# Patient Record
Sex: MALE | Race: WHITE | NOT HISPANIC OR LATINO | Employment: FULL TIME | URBAN - METROPOLITAN AREA
[De-identification: names, ages, dates, MRNs, and addresses within clinical notes are randomized per-mention and may not be internally consistent; named-entity substitution may affect disease eponyms.]

---

## 2018-08-24 ENCOUNTER — CONVERSION ENCOUNTER (OUTPATIENT)
Dept: SURGERY | Facility: CLINIC | Age: 58
End: 2018-08-24

## 2018-08-24 ENCOUNTER — OFFICE VISIT CONVERTED (OUTPATIENT)
Dept: SURGERY | Facility: CLINIC | Age: 58
End: 2018-08-24
Attending: SURGERY

## 2018-10-05 ENCOUNTER — OFFICE VISIT CONVERTED (OUTPATIENT)
Dept: UROLOGY | Facility: CLINIC | Age: 58
End: 2018-10-05
Attending: UROLOGY

## 2020-11-09 ENCOUNTER — OFFICE VISIT CONVERTED (OUTPATIENT)
Dept: SURGERY | Facility: CLINIC | Age: 60
End: 2020-11-09
Attending: SURGERY

## 2021-03-03 ENCOUNTER — HOSPITAL ENCOUNTER (OUTPATIENT)
Dept: GASTROENTEROLOGY | Facility: HOSPITAL | Age: 61
Setting detail: HOSPITAL OUTPATIENT SURGERY
Discharge: HOME | End: 2021-03-03
Attending: SURGERY

## 2021-05-10 NOTE — H&P
History and Physical      Patient Name: Ferdinand Kaye   Patient ID: [id number]   Sex: Male   YOB: 1960    Primary Care Provider: Wilfred ZAFAR   Referring Provider: Wilfred ZAFAR    Visit Date: November 9, 2020    Provider: Boris Espitia MD   Location: [facility]   Location Address: [address] [place] [address]  [id number]   Location Phone: [phone]          Chief Complaint  · Outpatient History & Physical / Surgical Orders  · Colon Consult      History Of Present Illness  Ferdinand Kaye is a 60 year old /White male who presents to the office today as a consult from [facility] Other.      Patient is here because he was on a callback list to have a colonoscopy.  I performed a colonoscopy on 9/5/2018 and removed an approximately 2 cm sized tubular adenoma from the ascending colon and the polyp was removed piecemeal.  No family history of colon cancer no change in normal bowel function.  The colonoscopy in 2018 was the patient's first colonoscopy.       Past Medical History  Disease Name Date Onset Notes   Allergic rhinitis, chronic --  --    Groin pain --  --    High blood pressure --  --    Shortness Of Air --  --          Past Surgical History  Procedure Name Date Notes   Colonoscopy --  --    Cyst Removal --  --    Inguinal Hernia Repair --  --          Allergy List  Allergen Name Date Reaction Notes   PENICILLINS --  --  --        Allergies Reconciled  Family Medical History  Disease Name Relative/Age Notes   Diabetes, unspecified type Grandmother (paternal)/   Grandmother (paternal)   Family history of colon cancer Grandmother (paternal)/60s   Grandmother (paternal)/60s   Family history of breast cancer  Aunt/50s         Social History  Finding Status Start/Stop Quantity Notes   Alcohol Current some day --/-- --  drinks rarely; liquor   Caffeine Current every day --/-- --  drinks regularly; coffee, tea and soft drinks; 5 or more times per  "day   Second hand smoke exposure Current some day --/-- --  yes   Tobacco Current every day 18/-- --  current everyday smoker; started smoking at age 18; smoked 20 cigarette(s) per day         Review of Systems  · Constitutional  o Denies  o : fever, chills  · Cardiovascular  o Denies  o : chest pain on exertion  · Respiratory  o Denies  o : shortness of breath, cough  · Gastrointestinal  o Denies  o : nausea, vomiting      Vitals  Date Time BP Position Site L\R Cuff Size HR RR TEMP (F) WT  HT  BMI kg/m2 BSA m2 O2 Sat FR L/min FiO2 HC       11/09/2020 08:36 AM       14  156lbs 2oz 5'  7\" 24.45 1.83             Physical Examination  · Constitutional  o Appearance  o : healthy appearing, alert and in no acute distress, reliable historian, here alone  · Head and Face  o Head  o :   § Inspection  § : no visable deformities or lesions  · Eyes  o Conjunctivae  o : clear, wearing glasses  o Sclerae  o : clear  · Neck  o Inspection/Palpation  o : normal appearance, no masses, trachea midline  · Respiratory  o Respiratory Effort  o : breathing unlabored, respiratory effort appears normal  o Inspection of Chest  o : normal appearance, no retractions  · Cardiovascular  o Heart  o : regular rate and rhythm  · Gastrointestinal  o Abdominal Examination  o :   § Abdomen  § : soft, nondistended  · Skin and Subcutaneous Tissue  o General Inspection  o : no visible concerning rashes or lesions present  · Neurologic  o Cranial Nerves  o : no obvious motor deficits  o Sensation  o : no obvious sensory deficits  o Gait and Station  o :   § Gait Screening  § : normal gait, able to stand without diffculty  o Cerebellar Function  o : no obvious abnormalities  · Psychiatric  o Judgement and Insight  o : judgment and insight intact  o Mood and Affect  o : mood normal, affect appropriate          Assessment  · Pre-Surgical Orders     V72.84  · Colonic Polyps, Personal History of     V12.72  · Preop " testing     V72.84/Z01.818      Plan  · Orders  o Colonoscopy (75823) - V72.84 - 01/13/2021  o [facility] Pre-Op Covid-19 Screening (95656) - V72.84/Z01.818 - 01/08/2021   at 12:15  · Medications  o Medications have been Reconciled  o Transition of Care or Provider Policy  · Instructions  o PLAN: Colonoscopy  o Outpatient  o The indications, options, risks, benefits, and expected outcomes of the planned procedure were discussed with the patient and the patient agrees to proceed.   o IV: LR@ 30 ml/hr  o Anesthesia: MAC  o PLEASE SIGN PERMIT FOR: Colonscopy with possible biopsy and possible polypectomy  o Electronically Identified Patient Education Materials Provided Electronically            Electronically Signed by: Boris Espitia MD -Author on November 9, 2020 09:03:49 AM

## 2021-05-14 VITALS — HEIGHT: 67 IN | RESPIRATION RATE: 14 BRPM | BODY MASS INDEX: 24.5 KG/M2 | WEIGHT: 156.12 LBS

## 2021-05-16 VITALS — WEIGHT: 149.5 LBS | RESPIRATION RATE: 14 BRPM | BODY MASS INDEX: 22.66 KG/M2 | HEIGHT: 68 IN

## 2021-05-16 VITALS — BODY MASS INDEX: 22.73 KG/M2 | RESPIRATION RATE: 14 BRPM | HEIGHT: 68 IN | WEIGHT: 150 LBS

## 2022-10-03 ENCOUNTER — OFFICE VISIT (OUTPATIENT)
Dept: SURGERY | Facility: CLINIC | Age: 62
End: 2022-10-03

## 2022-10-03 ENCOUNTER — PREP FOR SURGERY (OUTPATIENT)
Dept: OTHER | Facility: HOSPITAL | Age: 62
End: 2022-10-03

## 2022-10-03 VITALS — HEART RATE: 90 BPM | WEIGHT: 164 LBS | BODY MASS INDEX: 25.74 KG/M2 | HEIGHT: 67 IN

## 2022-10-03 DIAGNOSIS — Z80.0 FAMILY HISTORY OF COLON CANCER: ICD-10-CM

## 2022-10-03 DIAGNOSIS — Z86.010 HISTORY OF COLONIC POLYPS: ICD-10-CM

## 2022-10-03 DIAGNOSIS — Z12.11 SCREENING FOR MALIGNANT NEOPLASM OF COLON: Primary | ICD-10-CM

## 2022-10-03 PROBLEM — Z86.0100 HISTORY OF COLONIC POLYPS: Status: ACTIVE | Noted: 2022-10-03

## 2022-10-03 PROCEDURE — S0285 CNSLT BEFORE SCREEN COLONOSC: HCPCS | Performed by: NURSE PRACTITIONER

## 2022-10-03 RX ORDER — MULTIPLE VITAMINS W/ MINERALS TAB 9MG-400MCG
TAB ORAL
COMMUNITY
End: 2022-12-05

## 2022-10-03 RX ORDER — CHOLECALCIFEROL (VITAMIN D3) 1250 MCG
CAPSULE ORAL
COMMUNITY

## 2022-10-03 RX ORDER — MULTIPLE VITAMINS W/ MINERALS TAB 9MG-400MCG
TAB ORAL
COMMUNITY

## 2022-10-03 RX ORDER — POLYETHYLENE GLYCOL 3350 17 G/17G
POWDER, FOR SOLUTION ORAL
Qty: 238 PACKET | Refills: 0 | Status: SHIPPED | OUTPATIENT
Start: 2022-10-03 | End: 2022-12-05

## 2022-10-03 NOTE — PROGRESS NOTES
Chief Complaint: Colonoscopy (consult)    Subjective      Colonoscopy consultation       History of Present Illness  Ferdinand Kaye is a 62 y.o. male presents to [facility] for colonoscopy consultation.    Patient presents today without complaints for screening colonoscopy.  He denies any abdominal pain, change in bowel habit, or rectal bleeding.    Admits to family history of colon cancer with his paternal grandmother.    Admits to history of colonic polyps.    3/21: Colonoscopy (espitia): Ascending - tubular adenoma; diverticulosis.     9/18: Colonoscopy (Espitia): Ascending- tubular adenoma; Sigmoid - hyperplastic; @10cm - hyperplastic; diverticulosis; internal hemorrhoids.     Objective     History reviewed. No pertinent past medical history.    History reviewed. No pertinent surgical history.    Outpatient Medications Marked as Taking for the 10/3/22 encounter (Office Visit) with Jocelin Sanchez APRN   Medication Sig Dispense Refill   • ascorbic acid (VITAMIN C) 1000 MG tablet Vitamin C 1,000 mg oral tablet take 1 tablet by oral route daily   Suspended     • Cholecalciferol (Vitamin D3) 1.25 MG (67504 UT) capsule Take  by mouth Every 7 (Seven) Days.     • multivitamin with minerals tablet tablet      • multivitamin with minerals tablet tablet One Daily Multivitamin oral tablet take 1 tablet by oral route daily  qd   Suspended         Allergies   Allergen Reactions   • Penicillins Hives        Family History   Problem Relation Age of Onset   • Alcohol abuse Neg Hx        Social History     Socioeconomic History   • Marital status:    Tobacco Use   • Smoking status: Current Every Day Smoker     Packs/day: 1.00   • Smokeless tobacco: Never Used   Vaping Use   • Vaping Use: Never used   Substance and Sexual Activity   • Alcohol use: Never   • Drug use: Never   • Sexual activity: Defer       Review of Systems   Constitutional: Negative for chills and fever.   Gastrointestinal:  "Negative for abdominal distention, abdominal pain, anal bleeding, blood in stool, constipation, diarrhea and rectal pain.        Vital Signs:   Pulse 90   Ht 170.2 cm (67\")   Wt 74.4 kg (164 lb)   BMI 25.69 kg/m²      Physical Exam  Vitals and nursing note reviewed.   Constitutional:       General: He is not in acute distress.     Appearance: Normal appearance.   HENT:      Head: Normocephalic.   Cardiovascular:      Rate and Rhythm: Normal rate.   Pulmonary:      Effort: Pulmonary effort is normal.      Breath sounds: No stridor. No wheezing.   Abdominal:      Palpations: Abdomen is soft.      Tenderness: There is no guarding.   Musculoskeletal:         General: No deformity. Normal range of motion.   Skin:     General: Skin is warm and dry.      Coloration: Skin is not jaundiced.   Neurological:      General: No focal deficit present.      Mental Status: He is alert and oriented to person, place, and time.   Psychiatric:         Mood and Affect: Mood normal.         Thought Content: Thought content normal.          Result Review :          []  Laboratory  []  Radiology  [x]  Pathology  []  Microbiology  []  EKG/Telemetry   []  Cardiology/Vascular   [x]  Old records  Today I reviewed Dr. Winslow's previous colonoscopies and pathology's     Assessment and Plan    Diagnoses and all orders for this visit:    1. Screening for malignant neoplasm of colon (Primary)    2. History of colonic polyps    3. Family history of colon cancer    Other orders  -     polyethylene glycol (MIRALAX) 17 g packet; Take as directed.  Instructions given in office.  Dispense: 238 g bottle  Dispense: 238 packet; Refill: 0        Follow Up   Return for Schedule colonoscopy with Dr. Espitia on 12/7/2022 at [facility] [place].     Hospital arrival time: 0900.    Possible risks/complications, benefits, and alternatives to surgical or invasive procedure have been explained to patient and/or legal guardian.     Patient has been evaluated " and can tolerate anesthesia and/or sedation. Risks, benefits, and alternatives to anesthesia and sedation have been explained to patient and/or legal guardian.  Patient verbalizes understanding and is will proceed with above plan.     Patient was given instructions and counseling regarding his condition or for health maintenance advice. Please see specific information pulled into the AVS if appropriate.     The office note was dictated with the help of the dragon dictation system.

## 2022-12-07 ENCOUNTER — ANESTHESIA EVENT (OUTPATIENT)
Dept: GASTROENTEROLOGY | Facility: HOSPITAL | Age: 62
End: 2022-12-07

## 2022-12-07 ENCOUNTER — ANESTHESIA (OUTPATIENT)
Dept: GASTROENTEROLOGY | Facility: HOSPITAL | Age: 62
End: 2022-12-07

## 2022-12-07 ENCOUNTER — HOSPITAL ENCOUNTER (OUTPATIENT)
Facility: HOSPITAL | Age: 62
Setting detail: HOSPITAL OUTPATIENT SURGERY
Discharge: HOME | End: 2022-12-07
Attending: SURGERY | Admitting: SURGERY

## 2022-12-07 VITALS
HEART RATE: 80 BPM | HEIGHT: 67 IN | RESPIRATION RATE: 15 BRPM | BODY MASS INDEX: 24.19 KG/M2 | OXYGEN SATURATION: 94 % | DIASTOLIC BLOOD PRESSURE: 67 MMHG | TEMPERATURE: 98.3 F | WEIGHT: 154.1 LBS | SYSTOLIC BLOOD PRESSURE: 95 MMHG

## 2022-12-07 PROBLEM — Z86.0100 HISTORY OF COLONIC POLYPS: Status: RESOLVED | Noted: 2022-10-03 | Resolved: 2022-12-07

## 2022-12-07 PROBLEM — Z12.11 SCREENING FOR MALIGNANT NEOPLASM OF COLON: Status: RESOLVED | Noted: 2022-10-03 | Resolved: 2022-12-07

## 2022-12-07 PROBLEM — Z80.0 FAMILY HISTORY OF COLON CANCER: Status: RESOLVED | Noted: 2022-10-03 | Resolved: 2022-12-07

## 2022-12-07 PROBLEM — Z86.010 HISTORY OF COLONIC POLYPS: Status: RESOLVED | Noted: 2022-10-03 | Resolved: 2022-12-07

## 2022-12-07 PROCEDURE — 25010000002 PROPOFOL 10 MG/ML EMULSION: Performed by: NURSE ANESTHETIST, CERTIFIED REGISTERED

## 2022-12-07 RX ORDER — LIDOCAINE HYDROCHLORIDE 20 MG/ML
INJECTION, SOLUTION EPIDURAL; INFILTRATION; INTRACAUDAL; PERINEURAL AS NEEDED
Status: DISCONTINUED | OUTPATIENT
Start: 2022-12-07 | End: 2022-12-07 | Stop reason: SURG

## 2022-12-07 RX ORDER — MULTIPLE VITAMINS W/ MINERALS TAB 9MG-400MCG
1 TAB ORAL DAILY
COMMUNITY

## 2022-12-07 RX ORDER — SODIUM CHLORIDE, SODIUM LACTATE, POTASSIUM CHLORIDE, CALCIUM CHLORIDE 600; 310; 30; 20 MG/100ML; MG/100ML; MG/100ML; MG/100ML
30 INJECTION, SOLUTION INTRAVENOUS CONTINUOUS
Status: DISCONTINUED | OUTPATIENT
Start: 2022-12-07 | End: 2022-12-07 | Stop reason: HOSPADM

## 2022-12-07 RX ORDER — PROPOFOL 10 MG/ML
VIAL (ML) INTRAVENOUS AS NEEDED
Status: DISCONTINUED | OUTPATIENT
Start: 2022-12-07 | End: 2022-12-07 | Stop reason: SURG

## 2022-12-07 RX ADMIN — PROPOFOL 200 MCG/KG/MIN: 10 INJECTION, EMULSION INTRAVENOUS at 10:29

## 2022-12-07 RX ADMIN — LIDOCAINE HYDROCHLORIDE 25 MG: 20 INJECTION, SOLUTION EPIDURAL; INFILTRATION; INTRACAUDAL; PERINEURAL at 10:29

## 2022-12-07 RX ADMIN — PROPOFOL 80 MG: 10 INJECTION, EMULSION INTRAVENOUS at 10:29

## 2022-12-07 RX ADMIN — LIDOCAINE HYDROCHLORIDE 100 MG: 20 INJECTION, SOLUTION EPIDURAL; INFILTRATION; INTRACAUDAL; PERINEURAL at 10:33

## 2022-12-07 RX ADMIN — SODIUM CHLORIDE, POTASSIUM CHLORIDE, SODIUM LACTATE AND CALCIUM CHLORIDE: 600; 310; 30; 20 INJECTION, SOLUTION INTRAVENOUS at 10:27

## 2022-12-07 NOTE — ANESTHESIA POSTPROCEDURE EVALUATION
Patient: Ferdinand Kaye    Procedure Summary     Date: 12/07/22 Room / Location: [facility] ENDOSCOPY 1 / [facility] ENDOSCOPY    Anesthesia Start: 1027 Anesthesia Stop: 1056    Procedure: COLONOSCOPY Diagnosis:       Screening for malignant neoplasm of colon      History of colonic polyps      Family history of colon cancer      (Screening for malignant neoplasm of colon [Z12.11])      (History of colonic polyps [Z86.010])      (Family history of colon cancer [Z80.0])    Surgeons: Boris Espitia MD Provider: Jack Cain MD    Anesthesia Type: general ASA Status: 1          Anesthesia Type: general    Vitals  Vitals Value Taken Time   BP 95/67 12/07/22 1117   Temp 36.8 °C (98.3 °F) 12/07/22 1115   Pulse 78 12/07/22 1119   Resp 15 12/07/22 1115   SpO2 95 % 12/07/22 1119   Vitals shown include unvalidated device data.        Post Anesthesia Care and Evaluation    Patient location during evaluation: bedside  Patient participation: complete - patient participated  Level of consciousness: awake  Pain management: adequate    Airway patency: patent  Anesthetic complications: No anesthetic complications  PONV Status: none  Cardiovascular status: acceptable and stable  Respiratory status: acceptable  Hydration status: acceptable    Comments: An Anesthesiologist personally participated in the most demanding procedures (including induction and emergence if applicable) in the anesthesia plan, monitored the course of anesthesia administration at frequent intervals and remained physically present and available for immediate diagnosis and treatment of emergencies.

## 2022-12-07 NOTE — H&P
"Chief Complaint: Colonoscopy (consult)    Subjective      Colonoscopy consultation       History of Present Illness  Ferdinand Kaye is a 62 y.o. male presents to [facility] SUR[facility]ERY for colonoscopy consultation.    Patient presents today without complaints for screening colonoscopy.  He denies any abdominal pain, change in bowel habit, or rectal bleeding.    Admits to family history of colon cancer with his paternal grandmother.    Admits to history of colonic polyps.    3/21: Colonoscopy (espitia): Ascending - tubular adenoma; diverticulosis.     9/18: Colonoscopy (Espitia): Ascending- tubular adenoma; Sigmoid - hyperplastic; @10cm - hyperplastic; diverticulosis; internal hemorrhoids.     Objective     History reviewed. No pertinent past medical history.    History reviewed. No pertinent surgical history.    Outpatient Medications Marked as Taking for the 10/3/22 encounter (Office Visit) with Jocelin Sanchez APRN   Medication Sig Dispense Refill   • ascorbic acid (VITAMIN C) 1000 MG tablet Vitamin C 1,000 mg oral tablet take 1 tablet by oral route daily   Suspended     • Cholecalciferol (Vitamin D3) 1.25 MG (23484 UT) capsule Take  by mouth Every 7 (Seven) Days.     • multivitamin with minerals tablet tablet      • multivitamin with minerals tablet tablet One Daily Multivitamin oral tablet take 1 tablet by oral route daily  qd   Suspended         Allergies   Allergen Reactions   • Penicillins Hives        Family History   Problem Relation Age of Onset   • Alcohol abuse Neg Hx        Social History     Socioeconomic History   • Marital status:    Tobacco Use   • Smoking status: Current Every Day Smoker     Packs/day: 1.00   • Smokeless tobacco: Never Used   Vaping Use   • Vaping Use: Never used   Substance and Sexual Activity   • Alcohol use: Never   • Drug use: Never   • Sexual activity: Defer       Vital Signs:   Pulse 90   Ht 170.2 cm (67\")   Wt 74.4 kg (164 lb)   BMI 25.69 kg/m²    "   Physical Exam  Vitals and nursing note reviewed.   Constitutional:       General: He is not in acute distress.     Appearance: Normal appearance.   Cardiovascular:      Rate and Rhythm: Normal rate.   Pulmonary:      Effort: Pulmonary effort is normal.   Skin:     General: Skin is warm and dry.   Neurological:      General: No focal deficit present.     Assessment   Personal history of colon polyps    Plan  Colonoscopy    Risks and benefits discussed    Boris Espitia M.D.  12/07/22    Electronically signed by Boris Espitia MD, 12/07/22, 7:19 AM EST.

## 2023-07-12 PROBLEM — N20.0 RENAL CALCULI: Status: ACTIVE | Noted: 2023-07-12

## 2023-07-13 PROBLEM — N20.1 URETERAL STONE: Status: ACTIVE | Noted: 2023-07-12

## 2023-07-13 PROBLEM — N13.39 OTHER HYDRONEPHROSIS: Status: ACTIVE | Noted: 2023-07-13

## 2023-07-13 PROBLEM — N20.0 RENAL CALCULI: Status: RESOLVED | Noted: 2023-07-12 | Resolved: 2023-07-13

## 2023-07-27 NOTE — PRE-PROCEDURE INSTRUCTIONS
IMPORTANT INSTRUCTIONS - PRE-ADMISSION TESTING  DO NOT EAT OR CHEW anything after midnight the night before your procedure.    You may have CLEAR liquids up to _2 hours prior to ARRIVAL time.   Take the following medications the morning of your procedure with JUST A SIP OF WATER: INHALER, PYRIDIUM  IF NEEDED, TAMSULOSIN, PROZAC, ZOFRAN IF NEEDED    DO NOT BRING your medications to the hospital with you, UNLESS something has changed since your PRE-Admission Testing appointment.  Hold all vitamins, supplements, and NSAIDS (Non- steroidal anti-inflammatory meds) for one week prior to surgery (you MAY take Tylenol or Acetaminophen).  If you are diabetic, check your blood sugar the morning of your procedure. If it is less than 70 or if you are feeling symptomatic, call the following number for further instructions: [phone]  Use your inhalers/nebulizers as usual, the morning of your procedure. BRING YOUR INHALERS with you.   Bring your CPAP or BIPAP to hospital, ONLY IF YOU WILL BE SPENDING THE NIGHT.   Make sure you have a ride home and have someone who will stay with you the day of your procedure after you go home.  If you have any questions, please call your Pre-Admission Testing NurseLISA at [phone].   Per anesthesia request, do not smoke for 24 hours before your procedure or as instructed by your surgeon.  Clear Liquid Diet        Find out when you need to start a clear liquid diet.   Think of “clear liquids” as anything you could read a newspaper through. This includes things like water, broth, sports drinks, or tea WITHOUT any kind of milk or cream.           Once you are told to start a clear liquid diet, only drink these things until 2 hours before arrival to the hospital or when the hospital says to stop. Total volume limitation: 8 oz.       Clear liquids you CAN drink:   Water   Clear broth: beef, chicken, vegetable, or bone broth with nothing in it   Gatorade   Lemonade or Jacob-aid   Soda    Tea, coffee (NO cream or honey)   Jell-O (without fruit)   Popsicles (without fruit or cream)   Italian ices   Juice without pulp: apple, white, grape   You may use salt, pepper, and sugar    Do NOT drink:   Milk or cream   Soy milk, almond milk, coconut milk, or other non-dairy drinks and   creamers   Milkshakes or smoothies   Tomato juice   Orange juice   Grapefruit juice   Cream soups or any other than broth         Clear Liquid Diet:  Do NOT eat any solid food.  Do NOT eat or suck on mints or candy.  Do NOT chew gum.  Do NOT drink thick liquids like milk or juice with pulp in it.  Do NOT add milk, cream, or anything like soy milk or almond milk to coffee or tea.

## 2023-07-28 ENCOUNTER — ANESTHESIA EVENT (OUTPATIENT)
Dept: PERIOP | Facility: HOSPITAL | Age: 63
End: 2023-07-28
Payer: COMMERCIAL

## 2023-07-28 ENCOUNTER — ANESTHESIA (OUTPATIENT)
Dept: PERIOP | Facility: HOSPITAL | Age: 63
End: 2023-07-28
Payer: COMMERCIAL

## 2023-07-28 ENCOUNTER — APPOINTMENT (OUTPATIENT)
Dept: GENERAL RADIOLOGY | Facility: HOSPITAL | Age: 63
End: 2023-07-28
Payer: COMMERCIAL

## 2023-07-28 ENCOUNTER — HOSPITAL ENCOUNTER (OUTPATIENT)
Facility: HOSPITAL | Age: 63
Setting detail: HOSPITAL OUTPATIENT SURGERY
Discharge: HOME | End: 2023-07-28
Attending: UROLOGY | Admitting: UROLOGY
Payer: COMMERCIAL

## 2023-07-28 VITALS
BODY MASS INDEX: 22.42 KG/M2 | DIASTOLIC BLOOD PRESSURE: 66 MMHG | SYSTOLIC BLOOD PRESSURE: 107 MMHG | HEIGHT: 68 IN | HEART RATE: 71 BPM | RESPIRATION RATE: 18 BRPM | WEIGHT: 147.93 LBS | OXYGEN SATURATION: 95 % | TEMPERATURE: 97.1 F

## 2023-07-28 DIAGNOSIS — N20.1 URETERAL STONE: ICD-10-CM

## 2023-07-28 PROCEDURE — C1758 CATHETER, URETERAL: HCPCS | Performed by: UROLOGY

## 2023-07-28 PROCEDURE — 25010000002 PROPOFOL 10 MG/ML EMULSION: Performed by: NURSE ANESTHETIST, CERTIFIED REGISTERED

## 2023-07-28 PROCEDURE — 25010000002 DEXAMETHASONE PER 1 MG: Performed by: NURSE ANESTHETIST, CERTIFIED REGISTERED

## 2023-07-28 PROCEDURE — 88300 SURGICAL PATH GROSS: CPT | Performed by: UROLOGY

## 2023-07-28 PROCEDURE — C1769 GUIDE WIRE: HCPCS | Performed by: UROLOGY

## 2023-07-28 PROCEDURE — 76000 FLUOROSCOPY <1 HR PHYS/QHP: CPT

## 2023-07-28 PROCEDURE — 25010000002 MIDAZOLAM PER 1MG: Performed by: ANESTHESIOLOGY

## 2023-07-28 PROCEDURE — 25510000001 IOPAMIDOL PER 1 ML: Performed by: UROLOGY

## 2023-07-28 PROCEDURE — 25010000002 LEVOFLOXACIN PER 250 MG: Performed by: UROLOGY

## 2023-07-28 PROCEDURE — 25010000002 SUGAMMADEX 200 MG/2ML SOLUTION: Performed by: NURSE ANESTHETIST, CERTIFIED REGISTERED

## 2023-07-28 PROCEDURE — 52332 CYSTOSCOPY AND TREATMENT: CPT | Performed by: UROLOGY

## 2023-07-28 PROCEDURE — S0260 H&P FOR SURGERY: HCPCS | Performed by: UROLOGY

## 2023-07-28 PROCEDURE — 82365 CALCULUS SPECTROSCOPY: CPT | Performed by: UROLOGY

## 2023-07-28 PROCEDURE — 25010000002 FENTANYL CITRATE (PF) 50 MCG/ML SOLUTION: Performed by: NURSE ANESTHETIST, CERTIFIED REGISTERED

## 2023-07-28 PROCEDURE — 52352 CYSTOURETERO W/STONE REMOVE: CPT | Performed by: UROLOGY

## 2023-07-28 PROCEDURE — 25010000002 ONDANSETRON PER 1 MG: Performed by: NURSE ANESTHETIST, CERTIFIED REGISTERED

## 2023-07-28 PROCEDURE — C2617 STENT, NON-COR, TEM W/O DEL: HCPCS | Performed by: UROLOGY

## 2023-07-28 DEVICE — URETERAL STENT
Type: IMPLANTABLE DEVICE | Site: URETER | Status: FUNCTIONAL
Brand: ASCERTA™

## 2023-07-28 RX ORDER — TAMSULOSIN HYDROCHLORIDE 0.4 MG/1
0.4 CAPSULE ORAL DAILY
Qty: 10 CAPSULE | Refills: 0 | Status: SHIPPED | OUTPATIENT
Start: 2023-07-28

## 2023-07-28 RX ORDER — LIDOCAINE HYDROCHLORIDE 20 MG/ML
INJECTION, SOLUTION EPIDURAL; INFILTRATION; INTRACAUDAL; PERINEURAL AS NEEDED
Status: DISCONTINUED | OUTPATIENT
Start: 2023-07-28 | End: 2023-07-28 | Stop reason: SURG

## 2023-07-28 RX ORDER — IBUPROFEN 600 MG/1
600 TABLET ORAL EVERY 6 HOURS PRN
Status: DISCONTINUED | OUTPATIENT
Start: 2023-07-28 | End: 2023-07-28 | Stop reason: HOSPADM

## 2023-07-28 RX ORDER — PROMETHAZINE HYDROCHLORIDE 25 MG/1
25 SUPPOSITORY RECTAL ONCE AS NEEDED
Status: DISCONTINUED | OUTPATIENT
Start: 2023-07-28 | End: 2023-07-28 | Stop reason: HOSPADM

## 2023-07-28 RX ORDER — OXYCODONE HYDROCHLORIDE AND ACETAMINOPHEN 5; 325 MG/1; MG/1
1 TABLET ORAL EVERY 4 HOURS PRN
Status: DISCONTINUED | OUTPATIENT
Start: 2023-07-28 | End: 2023-07-28 | Stop reason: HOSPADM

## 2023-07-28 RX ORDER — PROMETHAZINE HYDROCHLORIDE 12.5 MG/1
25 TABLET ORAL ONCE AS NEEDED
Status: DISCONTINUED | OUTPATIENT
Start: 2023-07-28 | End: 2023-07-28 | Stop reason: HOSPADM

## 2023-07-28 RX ORDER — ONDANSETRON 2 MG/ML
4 INJECTION INTRAMUSCULAR; INTRAVENOUS ONCE AS NEEDED
Status: DISCONTINUED | OUTPATIENT
Start: 2023-07-28 | End: 2023-07-28 | Stop reason: HOSPADM

## 2023-07-28 RX ORDER — ONDANSETRON 2 MG/ML
INJECTION INTRAMUSCULAR; INTRAVENOUS AS NEEDED
Status: DISCONTINUED | OUTPATIENT
Start: 2023-07-28 | End: 2023-07-28 | Stop reason: SURG

## 2023-07-28 RX ORDER — ONDANSETRON 4 MG/1
4 TABLET, FILM COATED ORAL ONCE AS NEEDED
Status: DISCONTINUED | OUTPATIENT
Start: 2023-07-28 | End: 2023-07-28 | Stop reason: HOSPADM

## 2023-07-28 RX ORDER — SODIUM CHLORIDE, SODIUM LACTATE, POTASSIUM CHLORIDE, CALCIUM CHLORIDE 600; 310; 30; 20 MG/100ML; MG/100ML; MG/100ML; MG/100ML
9 INJECTION, SOLUTION INTRAVENOUS CONTINUOUS PRN
Status: DISCONTINUED | OUTPATIENT
Start: 2023-07-28 | End: 2023-07-28 | Stop reason: HOSPADM

## 2023-07-28 RX ORDER — FENTANYL CITRATE 50 UG/ML
INJECTION, SOLUTION INTRAMUSCULAR; INTRAVENOUS AS NEEDED
Status: DISCONTINUED | OUTPATIENT
Start: 2023-07-28 | End: 2023-07-28 | Stop reason: SURG

## 2023-07-28 RX ORDER — DEXAMETHASONE SODIUM PHOSPHATE 4 MG/ML
INJECTION, SOLUTION INTRA-ARTICULAR; INTRALESIONAL; INTRAMUSCULAR; INTRAVENOUS; SOFT TISSUE AS NEEDED
Status: DISCONTINUED | OUTPATIENT
Start: 2023-07-28 | End: 2023-07-28 | Stop reason: SURG

## 2023-07-28 RX ORDER — KETOROLAC TROMETHAMINE 10 MG/1
10 TABLET, FILM COATED ORAL EVERY 6 HOURS PRN
Qty: 8 TABLET | Refills: 0 | Status: SHIPPED | OUTPATIENT
Start: 2023-07-28

## 2023-07-28 RX ORDER — MIDAZOLAM HYDROCHLORIDE 2 MG/2ML
2 INJECTION, SOLUTION INTRAMUSCULAR; INTRAVENOUS ONCE
Status: COMPLETED | OUTPATIENT
Start: 2023-07-28 | End: 2023-07-28

## 2023-07-28 RX ORDER — ROCURONIUM BROMIDE 10 MG/ML
INJECTION, SOLUTION INTRAVENOUS AS NEEDED
Status: DISCONTINUED | OUTPATIENT
Start: 2023-07-28 | End: 2023-07-28 | Stop reason: SURG

## 2023-07-28 RX ORDER — MAGNESIUM HYDROXIDE 1200 MG/15ML
LIQUID ORAL AS NEEDED
Status: DISCONTINUED | OUTPATIENT
Start: 2023-07-28 | End: 2023-07-28 | Stop reason: HOSPADM

## 2023-07-28 RX ORDER — ACETAMINOPHEN 325 MG/1
650 TABLET ORAL ONCE
Status: DISCONTINUED | OUTPATIENT
Start: 2023-07-28 | End: 2023-07-28 | Stop reason: HOSPADM

## 2023-07-28 RX ORDER — PHENAZOPYRIDINE HYDROCHLORIDE 200 MG/1
200 TABLET, FILM COATED ORAL 3 TIMES DAILY PRN
Qty: 20 TABLET | Refills: 0 | Status: SHIPPED | OUTPATIENT
Start: 2023-07-28

## 2023-07-28 RX ORDER — ACETAMINOPHEN 500 MG
1000 TABLET ORAL ONCE
Status: COMPLETED | OUTPATIENT
Start: 2023-07-28 | End: 2023-07-28

## 2023-07-28 RX ORDER — OXYCODONE HYDROCHLORIDE AND ACETAMINOPHEN 5; 325 MG/1; MG/1
1-2 TABLET ORAL EVERY 6 HOURS PRN
Qty: 12 TABLET | Refills: 0 | Status: SHIPPED | OUTPATIENT
Start: 2023-07-28

## 2023-07-28 RX ORDER — PROPOFOL 10 MG/ML
VIAL (ML) INTRAVENOUS AS NEEDED
Status: DISCONTINUED | OUTPATIENT
Start: 2023-07-28 | End: 2023-07-28 | Stop reason: SURG

## 2023-07-28 RX ORDER — PROMETHAZINE HYDROCHLORIDE 12.5 MG/1
12.5 TABLET ORAL ONCE AS NEEDED
Status: DISCONTINUED | OUTPATIENT
Start: 2023-07-28 | End: 2023-07-28 | Stop reason: HOSPADM

## 2023-07-28 RX ORDER — LEVOFLOXACIN 5 MG/ML
500 INJECTION, SOLUTION INTRAVENOUS ONCE
Status: COMPLETED | OUTPATIENT
Start: 2023-07-28 | End: 2023-07-28

## 2023-07-28 RX ADMIN — DEXAMETHASONE SODIUM PHOSPHATE 4 MG: 4 INJECTION, SOLUTION INTRAMUSCULAR; INTRAVENOUS at 16:40

## 2023-07-28 RX ADMIN — LEVOFLOXACIN 500 MG: 500 INJECTION, SOLUTION INTRAVENOUS at 16:39

## 2023-07-28 RX ADMIN — ONDANSETRON 4 MG: 2 INJECTION INTRAMUSCULAR; INTRAVENOUS at 17:11

## 2023-07-28 RX ADMIN — FENTANYL CITRATE 50 MCG: 50 INJECTION, SOLUTION INTRAMUSCULAR; INTRAVENOUS at 16:45

## 2023-07-28 RX ADMIN — MIDAZOLAM HYDROCHLORIDE 2 MG: 1 INJECTION, SOLUTION INTRAMUSCULAR; INTRAVENOUS at 16:19

## 2023-07-28 RX ADMIN — SODIUM CHLORIDE, POTASSIUM CHLORIDE, SODIUM LACTATE AND CALCIUM CHLORIDE 9 ML/HR: 600; 310; 30; 20 INJECTION, SOLUTION INTRAVENOUS at 16:19

## 2023-07-28 RX ADMIN — FENTANYL CITRATE 50 MCG: 50 INJECTION, SOLUTION INTRAMUSCULAR; INTRAVENOUS at 16:55

## 2023-07-28 RX ADMIN — ACETAMINOPHEN 1000 MG: 500 TABLET ORAL at 16:19

## 2023-07-28 RX ADMIN — SUGAMMADEX 200 MG: 100 INJECTION, SOLUTION INTRAVENOUS at 17:11

## 2023-07-28 RX ADMIN — ROCURONIUM BROMIDE 50 MG: 10 SOLUTION INTRAVENOUS at 16:45

## 2023-07-28 RX ADMIN — PROPOFOL 150 MG: 10 INJECTION, EMULSION INTRAVENOUS at 16:45

## 2023-07-28 RX ADMIN — LIDOCAINE HYDROCHLORIDE 100 MG: 20 INJECTION, SOLUTION EPIDURAL; INFILTRATION; INTRACAUDAL; PERINEURAL at 16:45

## 2023-07-28 NOTE — OP NOTE
Preoperative diagnosis  Right ureteral stone    Postoperative diagnosis  Right ureteral stone    Procedure performed  Cystoscopy, right retrograde pyelogram, ureteroscopy, basket stone extraction, ureteral stent exchange    Attending surgeon  Sophia Florez MD     Anesthesia  General    EBL  0 mL    Complications  None    Specimen  Right ureteral stone    Findings  Diascopy without abnormality, bilateral orthotopic ureters; right ureteral stone retrieved with basket; no other stones appreciated; right retrograde pyelogram with mild hydronephrosis, no filling defects; exchange of 4.5 x 26 stent with string    Indications  63 y.o. male agreed to undergo the above named procedure after discussion of the alternatives, risks and benefits.   Informed consent was obtained.      Procedure  After informed consent was obtained.  The patient was taken back to the operating suite where general anesthesia was administered.  Once patient was adequately anesthetized he was placed in the dorsolithotomy position.  His genitals were prepped and draped in the normal sterile fashion.  A rigid cystoscope was inserted gently into the urethral meatus and passed atraumatically into the bladder.  Pan cystoscopy was performed and a 360 degree manner.  No abnormalities were noted.  There were no diverticula, trabeculations, lesions, or tumors.  Patient had bilateral orthotopic ureters.  Focus for the remainder of the procedure was placed on the right side.  The previously placed right ureteral stent was easily visible.  It was grasped and retrieved at the meatus.  Sensor wire then threaded through it up to the renal pelvis, the stent was then reduced. Next, semirigid ureteroscope was used to navigate the ureter.  The stone was encountered in the proximal mid ureter.  It was amenable to basket extraction.  Basket was inserted through the ureteroscope and used to retrieve the stone entirely.  It was then retrieved under direct vision and  passed off for chemical analysis.  The ureteroscope was then inserted again adjacent to the wire to ensure no further stone or fragment.  Further ureteroscopy up to the level of the renal pelvis did not reveal any ureteral abnormalities further fragments or calculi.  It was clear.  Contrast dye was injected through the ureteroscope, retrograde pyelogram indicated mild proximal hydronephrosis.  The ureteroscope was removed.  Finally, a 4.5 x 24 stent was placed over the wire.  Upon retrieval of the wire there was proximal coil within the UPJ and visible distal coil within the bladder.  Patient's bladder was then emptied and the procedure deemed terminated.  The string was secured with a Tegaderm.  He was awoken from general anesthesia and transferred to the PACU in stable condition.      Signed:  Sophia Florez MD  07/28/23  17:45 EDT

## 2023-07-28 NOTE — ANESTHESIA PREPROCEDURE EVALUATION
Anesthesia Evaluation     Patient summary reviewed and Nursing notes reviewed   no history of anesthetic complications:   NPO Solid Status: > 8 hours  NPO Liquid Status: > 2 hours           Airway   Mallampati: II  TM distance: >3 FB  Neck ROM: full  No difficulty expected  Dental          Pulmonary - normal exam    breath sounds clear to auscultation  (+) a smoker Current Smoked day of surgery, asthma,  Cardiovascular - negative cardio ROS and normal exam  Exercise tolerance: good (4-7 METS)    Rhythm: regular  Rate: normal        Neuro/Psych  (+) psychiatric history Anxiety  GI/Hepatic/Renal/Endo    (+) renal disease stones    Musculoskeletal (-) negative ROS    Abdominal    Substance History - negative use     OB/GYN negative ob/gyn ROS         Other - negative ROS       ROS/Med Hx Other: >4METS, NO  CP OR SOA, ASTHMA W/INHALER. EVAL W/DR. MCRAE  FOR  PALPITATIONS A COUPLE MONTHS AGO. EKG 07/13/23 REVIEWED W/DR. NORWOOD WITH NO FURTHER ORDERS. MAY PROCEED WITH SURGERY.  LMA                 Anesthesia Plan    ASA 2     general     (Patient understands anesthesia not responsible for dental damage.)  intravenous induction     Anesthetic plan, risks, benefits, and alternatives have been provided, discussed and informed consent has been obtained with: patient.    Use of blood products discussed with patient .    Plan discussed with CRNA.    CODE STATUS:

## 2023-07-28 NOTE — H&P
[facility]   Urology Preop H&P Note    Patient Name: Ferdinand Kaye  : 1960  MRN: [id number]  Primary Care Physician:  Dre Del Valle Jr., MD  Referring Physician: Sophia Florez MD  Date of admission: 2023    Subjective   Subjective     Reason for Consult/ Chief Complaint: Ureteral stone [N20.1]    HPI:  Ferdinand Kaye is a 63 y.o. male history ofUreteral stone [N20.1] who presents for further management OR.  Presents for planned Procedure(s):  CYSTOSCOPY URETEROSCOPY RETROGRADE PYELOGRAM HOLMIUM LASER STENT exchange, right;  .  Procedure to be preformed on the right.  Risk, benefits, and alternatives discussed with patient prior to today.All questions were addressed after providing time for discussion.  Patient denies significant changes since last visit.  No new complaints today.    Review of Systems   All systems were reviewed and negative except for the above  Personal History     Past Medical History:   Diagnosis Date    Anxiety     Asthma     INHALER    Palpitation     FOLLOWS W/DR. JESSICA CORREA, NO CP OR SOA    Seasonal allergies     Ureteral stone        Past Surgical History:   Procedure Laterality Date    COLONOSCOPY      COLONOSCOPY N/A 2022    Procedure: COLONOSCOPY;  Surgeon: Boris Espitia MD;  Location: [facility] ENDOSCOPY;  Service: General;  Laterality: N/A;  DIVERTICULOSIS    CYSTOSCOPY, URETEROSCOPY, RETROGRADE PYELOGRAM, STENT INSERTION Right 2023    Procedure: CYSTOSCOPY URETEROSCOPY RETROGRADE PYELOGRAM, STENT INSERTION, right;  Surgeon: Sophia Florez MD;  Location: [facility] MAIN OR;  Service: Urology;  Laterality: Right;    HAND SURGERY Left     TOOTH EXTRACTION         Family History: family history is not on file. Otherwise pertinent FHx was reviewed and not pertinent to current issue.    Social History:  reports that he has been smoking cigarettes. He has a 50.00 pack-year smoking history. He has never used smokeless tobacco. He reports that he  does not drink alcohol and does not use drugs.    Home Medications:  Budeson-Glycopyrrol-Formoterol, Cranberry-Vitamin C-Probiotic, FLUoxetine, NON FORMULARY, Vitamin D, ascorbic acid, atorvastatin, multivitamin with minerals, ondansetron ODT, phenazopyridine, and tamsulosin    Allergies:  Allergies   Allergen Reactions    Amoxicillin Anaphylaxis and Hives    Penicillins Hives       Objective    Objective     Vitals:   Temp:  [97.5 °F (36.4 °C)] 97.5 °F (36.4 °C)  Heart Rate:  [80] 80  Resp:  [18] 18  BP: (108)/(74) 108/74    Physical Exam:   Constitutional: Awake, alert   Respiratory: Clear, nonlabored respirations    Cardiovascular: Regular rate, no chest retractions   gastrointestinal: Appears soft, nontender     Results:    Assessment & Plan   Assessment / Plan     Brief Patient Summary:  Ferdinand Kaye is a 63 y.o. male who     Active Hospital Problems:  Active Hospital Problems    Diagnosis     **Ureteral stone        Plan:   Proceed to the OR for planned procedure, Procedure(s):  CYSTOSCOPY URETEROSCOPY RETROGRADE PYELOGRAM HOLMIUM LASER STENT exchange, right,    Risk, benefits, and alternatives discussed with patient at length he is agreeable to proceed  Laterality identified, right  All questions addressed      Electronically signed by Sophia Florez MD, 07/28/23, 3:20 PM EDT.

## 2023-07-28 NOTE — DISCHARGE INSTRUCTIONS
DISCHARGE INSTRUCTIONS  Ureteroscopy Lasertripsy      For your surgery you had:  General anesthesia (you may have a sore throat for the first 24 hours)    You may experience dizziness, drowsiness, or lightheadedness for several hours following surgery.  Do not stay alone today or tonight.  Limit your activity for 24 hours.  You should not drive or operate machinery, drink alcohol, or sign legally binding documents for 24 hours or while you are taking pain medication.  Resume your diet slowly.  Follow any special dietary instructions you may have been given by your doctor.     NOTIFY YOUR DOCTOR IF YOU EXPERIENCE ANY OF THE FOLLOWING:  Temperature greater than 101 degrees Fahrenheit  Shaking Chills  Redness or excessive drainage from incision  Nausea, vomiting and/or pain that is not controlled by prescribed medications  Increase in bleeding or bleeding that is excessive  Unable to urinate in 6 hours after surgery  If unable to reach your doctor, please go to the closest Emergency Room  Strain urine if instructed by physician.  Collect any fragments and take with you on your scheduled appointment. You may pass small blood clots.  Blood in your urine is normal.  It could be light pink to cherry color.  Drink 6-8 glasses of fluid each day to assist with clearing of kidneys.  Back pain is common.  It may feel like a dull ache or back spasm.  Urine will be bloody for several days.  Slight redness or bruising may be noticed on treated side.  If you have difficulty urinating, try sitting in a bathtub of warm water.    If you have a stent, it must be managed by your urologist.  Do NOT forget.  Medications per physician instructions as indicated on Discharge Medication Information Sheet.    Last dose of pain medication was given at:   .    SPECIAL INSTRUCTIONS:

## 2023-07-28 NOTE — DISCHARGE INSTR - LAB
Call scheduling at [phone] ext [phone] Monday to make renal ultrasound in 5 weeks.  Call office Monday to make follow up appointment in 6 weeks.

## 2023-07-28 NOTE — ANESTHESIA POSTPROCEDURE EVALUATION
Patient: Ferdinand Kaye    Procedure Summary       Date: 07/28/23 Room / Location: [facility] OR 07 / [facility] MAIN OR    Anesthesia Start: 1639 Anesthesia Stop: 1719    Procedure: CYSTOSCOPY URETEROSCOPY RETROGRADE PYELOGRAM STENT EXCHANGE, right (Right) Diagnosis:       Ureteral stone      (Ureteral stone [N20.1])    Surgeons: Sophia Florez MD Provider: Priti Clemens MD    Anesthesia Type: general ASA Status: 2            Anesthesia Type: general    Vitals  Vitals Value Taken Time   BP 94/62 07/28/23 1737   Temp 36.3 °C (97.4 °F) 07/28/23 1720   Pulse 82 07/28/23 1741   Resp 18 07/28/23 1735   SpO2 96 % 07/28/23 1741   Vitals shown include unvalidated device data.        Post Anesthesia Care and Evaluation    Patient location during evaluation: bedside  Patient participation: complete - patient participated  Level of consciousness: awake  Pain management: adequate    Airway patency: patent  PONV Status: none  Cardiovascular status: acceptable and stable  Respiratory status: acceptable  Hydration status: acceptable    Comments: An Anesthesiologist personally participated in the most demanding procedures (including induction and emergence if applicable) in the anesthesia plan, monitored the course of anesthesia administration at frequent intervals and remained physically present and available for immediate diagnosis and treatment of emergencies.

## 2023-07-31 DIAGNOSIS — T19.9XXA FOREIGN BODY IN GENITOURINARY TRACT, INITIAL ENCOUNTER: Primary | ICD-10-CM

## 2023-08-01 LAB
LAB AP CASE REPORT: NORMAL
LAB AP CLINICAL INFORMATION: NORMAL
PATH REPORT.FINAL DX SPEC: NORMAL
PATH REPORT.GROSS SPEC: NORMAL

## 2023-08-09 LAB
CALCIUM OXALATE DIHYDRATE MFR STONE IR: 80 %
COLOR STONE: NORMAL
COM MFR STONE: 15 %
COMPN STONE: NORMAL
HYDROXYAPATITE: 5 %
LABORATORY COMMENT REPORT: NORMAL
LABORATORY COMMENT REPORT: NORMAL
Lab: NORMAL
Lab: NORMAL
PHOTO: NORMAL
SIZE STONE: NORMAL MM
SPEC SOURCE SUBJ: NORMAL
STONE ANALYSIS-IMP: NORMAL
WT STONE: 13 MG

## 2023-09-01 ENCOUNTER — HOSPITAL ENCOUNTER (OUTPATIENT)
Dept: ULTRASOUND IMAGING | Facility: HOSPITAL | Age: 63
Discharge: HOME | End: 2023-09-01
Admitting: UROLOGY
Payer: COMMERCIAL

## 2023-09-01 DIAGNOSIS — T19.9XXA FOREIGN BODY IN GENITOURINARY TRACT, INITIAL ENCOUNTER: ICD-10-CM

## 2023-09-01 PROCEDURE — 76775 US EXAM ABDO BACK WALL LIM: CPT

## 2023-09-07 ENCOUNTER — OFFICE VISIT (OUTPATIENT)
Dept: UROLOGY | Facility: CLINIC | Age: 63
End: 2023-09-07
Payer: COMMERCIAL

## 2023-09-07 VITALS — BODY MASS INDEX: 22.73 KG/M2 | HEIGHT: 68 IN | WEIGHT: 150 LBS

## 2023-09-07 DIAGNOSIS — E83.59 CALCIUM OXALATE CALCULUS: Primary | ICD-10-CM

## 2023-09-07 NOTE — PROGRESS NOTES
UROLOGY OFFICE follow up NOTE    Subjective   HPI  Ferdinand Kaye is a 63 y.o. male.  Presents for follow-up status post right ureteroscopy, stone treatment, 7/28/2023.  Patient subsequently removed stent as instructed and presents with renal ultrasound prior to today's appointment.    Patient states that he has been doing well since the procedure.  Currently denies any urinary symptoms.  Denies hematuria or flank pain.  No complaints today.  Preop urinary symptoms improved.    ______  Right URS, basket stone extraction 7/28/2023  Stone analysis 95% calcium oxalate    Renal ultrasound 9/1/2023:   Mild dilatation of the right renal collecting system.  Normal bilateral ureteral jets in the urinary bladder.    CT abdomen pelvis without contrast [facility] 3 mm obstructing stone in the mid right ureter with moderate hydronephrosis and perinephric edema.   (Read does not report any other stones)      Results for orders placed or performed during the hospital encounter of 07/28/23   STONE ANALYSIS - Calculus, Ureter, Right    Specimen: Ureter, Right; Calculus   Result Value Ref Range    Stone Source Comment     Color Brown     Size 3x3 mm    Stone Weight 13 mg    Composition Comment     Ca Oxalate - Monohydrate, Stone 15 %    Ca Oxalate-Dihydrate, Stone 80 %    HYDROXYAPATITE 5 %    Comment Comment     Comment Comment     Photo Comment     Comments: Comment     Please note Comment     Disclaimer: Comment    Tissue Pathology Exam    Specimen: Ureter, Right; Calculus   Result Value Ref Range    Case Report       Surgical Pathology Report                         Case: [id number]                                  Authorizing Provider:  Sophia Florez MD      Collected:           07/28/2023 05:03 PM          Ordering Location:     [facility] MAIN Received:            07/31/2023 07:27 AM                                 OR                                                                          "  Pathologist:           Lee Chapin MD                                                            Specimen:    Ureter, Right, RIGHT URETERAL STONE                                                        Clinical Information       Ureteral stone      Final Diagnosis       Right ureter, stone, removal:   - Stone, sent for chemical analysis (gross only diagnosis)         Gross Description       1. Ureter, Right.  The specimen is received fresh labeled \" right ureteral stone\" and consists of a 0.5 x 0.3 x 0.3 cm aggregate of brown, friable renal stones.  The specimen is submitted en toto for chemical analysis following gross examination by staff pathologist Lee Chapin.   GERARDO         Review of systems  A review of systems was performed, and positive findings are noted in the HPI.    Objective     Vital Signs:   There were no vitals taken for this visit.      Physical exam  No acute distress, well-nourished  Awake alert and oriented  Mood normal; affect normal    Problem List:  Patient Active Problem List   Diagnosis    Ureteral stone    Other hydronephrosis       Assessment & Plan   Diagnoses and all orders for this visit:    1. Calcium oxalate calculus (Primary)      Renal ultrasound imaging reviewed and discussed with patient at length, no hydronephrosis, or evidence of residual stone.  Mild right dilation of the intrarenal collecting system believed to be physiologic or secondary to hydration status; bilateral ureteral jets were visualized.  No jorge hydronephrosis.      No further intervention necessary. Consider patient stone free at this time.      Calcium oxalate stone- Discussed dietary modifications for prevention of stone growth and recurrence including increased hydration, decrease sodium, normal calcium, low animal protein diet.      Informational handouts provided  All questions addressed     Encouraged to follow-up as needed       "

## 2024-01-15 ENCOUNTER — TELEPHONE (OUTPATIENT)
Dept: UROLOGY | Facility: CLINIC | Age: 64
End: 2024-01-15
Payer: COMMERCIAL

## 2024-01-15 NOTE — TELEPHONE ENCOUNTER
PT HAD KIDNEY STONES AND HAD SURGERY IN JULY. EVERY SINCE THEN HE HAS BEEN DEALING WITH DIARRHEA AND VOMITING THAT GETS HIM DOWN FOR A FEW DAYS WHEN IT HAPPENS. HE SAID IT WAS NEVER A PROBLEM BEFORE SURGERY. HE IS ASKING FOR ADVICE OR IF HE SHOULD BE REFERRED TO GASTRO.

## 2024-02-05 ENCOUNTER — PREP FOR SURGERY (OUTPATIENT)
Dept: OTHER | Facility: HOSPITAL | Age: 64
End: 2024-02-05
Payer: COMMERCIAL

## 2024-02-05 ENCOUNTER — OFFICE VISIT (OUTPATIENT)
Dept: SURGERY | Facility: CLINIC | Age: 64
End: 2024-02-05
Payer: COMMERCIAL

## 2024-02-05 ENCOUNTER — TELEPHONE (OUTPATIENT)
Dept: SURGERY | Facility: CLINIC | Age: 64
End: 2024-02-05

## 2024-02-05 VITALS
HEIGHT: 68 IN | WEIGHT: 158 LBS | HEART RATE: 109 BPM | SYSTOLIC BLOOD PRESSURE: 119 MMHG | BODY MASS INDEX: 23.95 KG/M2 | DIASTOLIC BLOOD PRESSURE: 79 MMHG

## 2024-02-05 DIAGNOSIS — R11.2 NAUSEA WITH VOMITING: Primary | ICD-10-CM

## 2024-02-05 DIAGNOSIS — R11.2 NAUSEA AND VOMITING, UNSPECIFIED VOMITING TYPE: Primary | ICD-10-CM

## 2024-02-05 RX ORDER — FLUTICASONE PROPIONATE 50 MCG
SPRAY, SUSPENSION (ML) NASAL
COMMUNITY
Start: 2023-09-28

## 2024-02-05 RX ORDER — CHLORAL HYDRATE 500 MG
CAPSULE ORAL
COMMUNITY

## 2024-02-05 RX ORDER — SUCRALFATE 1 G/1
TABLET ORAL
Status: ON HOLD | COMMUNITY
Start: 2024-01-17 | End: 2024-02-07

## 2024-02-05 RX ORDER — SODIUM CHLORIDE 0.9 % (FLUSH) 0.9 %
10 SYRINGE (ML) INJECTION AS NEEDED
Status: CANCELLED | OUTPATIENT
Start: 2024-02-05

## 2024-02-05 RX ORDER — AMITRIPTYLINE HYDROCHLORIDE 25 MG/1
TABLET, FILM COATED ORAL
COMMUNITY
Start: 2024-01-17

## 2024-02-05 RX ORDER — SODIUM CHLORIDE 9 MG/ML
40 INJECTION, SOLUTION INTRAVENOUS AS NEEDED
Status: CANCELLED | OUTPATIENT
Start: 2024-02-05

## 2024-02-05 RX ORDER — SODIUM CHLORIDE 0.9 % (FLUSH) 0.9 %
3 SYRINGE (ML) INJECTION EVERY 12 HOURS SCHEDULED
Status: CANCELLED | OUTPATIENT
Start: 2024-02-05

## 2024-02-05 NOTE — H&P (VIEW-ONLY)
Chief Complaint: Colonoscopy (consult)    Subjective      EGD consultation       History of Present Illness  Ferdinand Kaye is a 63 y.o. male presents to [facility] GENERAL SURGERY for EGD consultation.    Patient presents today with complaints of nausea and vomiting after having his kidney stone surgery.  She underwent a cystoscopy ureteroscopy on July 28, 2023 performed by Dr. Sophia Florez.    Patient reports that he is with nausea and vomiting about every 2 weeks, reports that even awakens him at night.  Patient does report that when he takes his Zofran he does feel better.  He does admit to heartburn and indigestion, despite taking OTC meds.  Patient denies any melena.  Denies any epigastric pain.  Patient does admit to excessive belching and gas.  Feels bloated.  Feeling full before finishing his meals.    Patient does admit to diarrhea.  Denies any change in bowel habit or rectal bleeding.  Denies any pain before or after eating.  Admits to family history of colon cancer with his paternal grandmother.    12/22: Colonoscopy ([facility]): diverticulosis.    3/21: Colonoscopy ([facility]): Ascending - tubular adenoma; diverticulosis.    9/18: Colonoscopy ([facility]): Ascending - tubular adenoma; Sigmoid- hyperplastic; @10cm - hyperplastic; diverticulosis; internal hemorrhoids.       Objective     Past Medical History:   Diagnosis Date    Anxiety     Asthma     INHALER    Nausea with vomiting 2/5/2024    Palpitation     FOLLOWS W/DR. MCRAE-BOWLING  GREEN, NO CP OR SOA    Seasonal allergies     Ureteral stone        Past Surgical History:   Procedure Laterality Date    COLONOSCOPY      COLONOSCOPY N/A 12/7/2022    Procedure: COLONOSCOPY;  Surgeon: Boris Espitia MD;  Location: [facility] ENDOSCOPY;  Service: General;  Laterality: N/A;  DIVERTICULOSIS    CYSTOSCOPY, URETEROSCOPY, RETROGRADE PYELOGRAM, STENT INSERTION Right 7/13/2023    Procedure: CYSTOSCOPY URETEROSCOPY RETROGRADE PYELOGRAM, STENT INSERTION,  right;  Surgeon: Sophia Florez MD;  Location: [facility] MAIN OR;  Service: Urology;  Laterality: Right;    HAND SURGERY Left     TOOTH EXTRACTION      URETEROSCOPY LASER LITHOTRIPSY WITH STENT INSERTION Right 7/28/2023    Procedure: CYSTOSCOPY URETEROSCOPY RETROGRADE PYELOGRAM STENT EXCHANGE, right;  Surgeon: Sophia Florez MD;  Location: [facility] MAIN OR;  Service: Urology;  Laterality: Right;       Outpatient Medications Marked as Taking for the 2/5/24 encounter (Office Visit) with Daniel April, APRN   Medication Sig Dispense Refill    amitriptyline (ELAVIL) 25 MG tablet       atorvastatin (LIPITOR) 10 MG tablet Take 1 tablet by mouth every night at bedtime.      Budeson-Glycopyrrol-Formoterol (BREZTRI) 160-9-4.8 MCG/ACT aerosol inhaler Inhale 2 puffs 2 (Two) Times a Day.      FLUoxetine (PROzac) 20 MG tablet Take 1 tablet by mouth Daily.      fluticasone (FLONASE) 50 MCG/ACT nasal spray SMARTSIG:Spray(s) Both Nares      Omega-3 Fatty Acids (fish oil) 1000 MG capsule capsule Take  by mouth Daily With Breakfast.      sucralfate (CARAFATE) 1 g tablet          Allergies   Allergen Reactions    Amoxicillin Anaphylaxis and Hives    Penicillins Hives        Family History   Problem Relation Age of Onset    Alcohol abuse Neg Hx     Malig Hyperthermia Neg Hx        Social History     Socioeconomic History    Marital status:    Tobacco Use    Smoking status: Every Day     Packs/day: 1.00     Years: 50.00     Additional pack years: 0.00     Total pack years: 50.00     Types: Cigarettes    Smokeless tobacco: Never    Tobacco comments:     Last 12/6/22 in the afternoon   Vaping Use    Vaping Use: Never used   Substance and Sexual Activity    Alcohol use: Never    Drug use: Never    Sexual activity: Defer       Review of Systems   Constitutional:  Negative for chills and fever.   Gastrointestinal:  Negative for abdominal distention, abdominal pain, anal bleeding, blood in stool, constipation, diarrhea and rectal  "pain.        Vital Signs:   /79 (BP Location: Right arm)   Pulse 109   Ht 172.7 cm (68\")   Wt 71.7 kg (158 lb)   BMI 24.02 kg/m²      Physical Exam  Vitals and nursing note reviewed.   Constitutional:       General: He is not in acute distress.     Appearance: Normal appearance.   HENT:      Head: Normocephalic.   Cardiovascular:      Rate and Rhythm: Normal rate.   Pulmonary:      Effort: Pulmonary effort is normal.      Breath sounds: No stridor.   Abdominal:      Palpations: Abdomen is soft.      Tenderness: There is no guarding.   Musculoskeletal:         General: No deformity. Normal range of motion.   Skin:     General: Skin is warm and dry.      Coloration: Skin is not jaundiced.   Neurological:      General: No focal deficit present.      Mental Status: He is alert and oriented to person, place, and time.   Psychiatric:         Mood and Affect: Mood normal.         Thought Content: Thought content normal.          Result Review :          []  Laboratory  []  Radiology  []  Pathology  []  Microbiology  []  EKG/Telemetry   []  Cardiology/Vascular   []  Old records  I spent 15 minutes caring for Ferdinand on this date of service. This time includes time spent by me in the following activities: reviewing tests, obtaining and/or reviewing a separately obtained history, performing a medically appropriate examination and/or evaluation, ordering medications, tests, or procedures, and documenting information in the medical record.     Assessment and Plan    Diagnoses and all orders for this visit:    1. Nausea and vomiting, unspecified vomiting type (Primary)  -     CT Abdomen Pelvis With Contrast; Future    I did go ahead and order a CT scan to re-evaluate for kidney stones.  I did discuss that his gallbladder could also be bad.  Will wait and see what the EGD reveals and make a plan from there.      Follow Up   Return for Schedule EGD with Dr. Espitia on 2/7/2024 [facility].    Hospital arrival " time: 0930.    Possible risks/complications, benefits, and alternatives to surgical or invasive procedures have been explained to patient and/or legal guardian.    Patient has been evaluated and can tolerate anesthesia and/or sedation. Risks, benefits, and alternatives to anesthesia and sedation have been explained to the patient and/or legal guardian. Patient verbalizes understanding and is willing to proceed with the above plan.     Patient was given instructions and counseling regarding his condition or for health maintenance advice. Please see specific information pulled into the AVS if appropriate.

## 2024-02-05 NOTE — PROGRESS NOTES
Chief Complaint: Colonoscopy (consult)    Subjective      EGD consultation       History of Present Illness  Ferdinand Kaye is a 63 y.o. male presents to [facility] GENERAL SURGERY for EGD consultation.    Patient presents today with complaints of nausea and vomiting after having his kidney stone surgery.  She underwent a cystoscopy ureteroscopy on July 28, 2023 performed by Dr. Sophia Florez.    Patient reports that he is with nausea and vomiting about every 2 weeks, reports that even awakens him at night.  Patient does report that when he takes his Zofran he does feel better.  He does admit to heartburn and indigestion, despite taking OTC meds.  Patient denies any melena.  Denies any epigastric pain.  Patient does admit to excessive belching and gas.  Feels bloated.  Feeling full before finishing his meals.    Patient does admit to diarrhea.  Denies any change in bowel habit or rectal bleeding.  Denies any pain before or after eating.  Admits to family history of colon cancer with his paternal grandmother.    12/22: Colonoscopy ([facility]): diverticulosis.    3/21: Colonoscopy ([facility]): Ascending - tubular adenoma; diverticulosis.    9/18: Colonoscopy ([facility]): Ascending - tubular adenoma; Sigmoid- hyperplastic; @10cm - hyperplastic; diverticulosis; internal hemorrhoids.       Objective     Past Medical History:   Diagnosis Date    Anxiety     Asthma     INHALER    Nausea with vomiting 2/5/2024    Palpitation     FOLLOWS W/DR. MCRAE-BOWLING  GREEN, NO CP OR SOA    Seasonal allergies     Ureteral stone        Past Surgical History:   Procedure Laterality Date    COLONOSCOPY      COLONOSCOPY N/A 12/7/2022    Procedure: COLONOSCOPY;  Surgeon: Boris Espitia MD;  Location: [facility] ENDOSCOPY;  Service: General;  Laterality: N/A;  DIVERTICULOSIS    CYSTOSCOPY, URETEROSCOPY, RETROGRADE PYELOGRAM, STENT INSERTION Right 7/13/2023    Procedure: CYSTOSCOPY URETEROSCOPY RETROGRADE PYELOGRAM, STENT INSERTION,  right;  Surgeon: Sophia Florez MD;  Location: [facility] MAIN OR;  Service: Urology;  Laterality: Right;    HAND SURGERY Left     TOOTH EXTRACTION      URETEROSCOPY LASER LITHOTRIPSY WITH STENT INSERTION Right 7/28/2023    Procedure: CYSTOSCOPY URETEROSCOPY RETROGRADE PYELOGRAM STENT EXCHANGE, right;  Surgeon: Sophia Florez MD;  Location: [facility] MAIN OR;  Service: Urology;  Laterality: Right;       Outpatient Medications Marked as Taking for the 2/5/24 encounter (Office Visit) with Daniel April, APRN   Medication Sig Dispense Refill    amitriptyline (ELAVIL) 25 MG tablet       atorvastatin (LIPITOR) 10 MG tablet Take 1 tablet by mouth every night at bedtime.      Budeson-Glycopyrrol-Formoterol (BREZTRI) 160-9-4.8 MCG/ACT aerosol inhaler Inhale 2 puffs 2 (Two) Times a Day.      FLUoxetine (PROzac) 20 MG tablet Take 1 tablet by mouth Daily.      fluticasone (FLONASE) 50 MCG/ACT nasal spray SMARTSIG:Spray(s) Both Nares      Omega-3 Fatty Acids (fish oil) 1000 MG capsule capsule Take  by mouth Daily With Breakfast.      sucralfate (CARAFATE) 1 g tablet          Allergies   Allergen Reactions    Amoxicillin Anaphylaxis and Hives    Penicillins Hives        Family History   Problem Relation Age of Onset    Alcohol abuse Neg Hx     Malig Hyperthermia Neg Hx        Social History     Socioeconomic History    Marital status:    Tobacco Use    Smoking status: Every Day     Packs/day: 1.00     Years: 50.00     Additional pack years: 0.00     Total pack years: 50.00     Types: Cigarettes    Smokeless tobacco: Never    Tobacco comments:     Last 12/6/22 in the afternoon   Vaping Use    Vaping Use: Never used   Substance and Sexual Activity    Alcohol use: Never    Drug use: Never    Sexual activity: Defer       Review of Systems   Constitutional:  Negative for chills and fever.   Gastrointestinal:  Negative for abdominal distention, abdominal pain, anal bleeding, blood in stool, constipation, diarrhea and rectal  "pain.        Vital Signs:   /79 (BP Location: Right arm)   Pulse 109   Ht 172.7 cm (68\")   Wt 71.7 kg (158 lb)   BMI 24.02 kg/m²      Physical Exam  Vitals and nursing note reviewed.   Constitutional:       General: He is not in acute distress.     Appearance: Normal appearance.   HENT:      Head: Normocephalic.   Cardiovascular:      Rate and Rhythm: Normal rate.   Pulmonary:      Effort: Pulmonary effort is normal.      Breath sounds: No stridor.   Abdominal:      Palpations: Abdomen is soft.      Tenderness: There is no guarding.   Musculoskeletal:         General: No deformity. Normal range of motion.   Skin:     General: Skin is warm and dry.      Coloration: Skin is not jaundiced.   Neurological:      General: No focal deficit present.      Mental Status: He is alert and oriented to person, place, and time.   Psychiatric:         Mood and Affect: Mood normal.         Thought Content: Thought content normal.          Result Review :          []  Laboratory  []  Radiology  []  Pathology  []  Microbiology  []  EKG/Telemetry   []  Cardiology/Vascular   []  Old records  I spent 15 minutes caring for Ferdinand on this date of service. This time includes time spent by me in the following activities: reviewing tests, obtaining and/or reviewing a separately obtained history, performing a medically appropriate examination and/or evaluation, ordering medications, tests, or procedures, and documenting information in the medical record.     Assessment and Plan    Diagnoses and all orders for this visit:    1. Nausea and vomiting, unspecified vomiting type (Primary)  -     CT Abdomen Pelvis With Contrast; Future    I did go ahead and order a CT scan to re-evaluate for kidney stones.  I did discuss that his gallbladder could also be bad.  Will wait and see what the EGD reveals and make a plan from there.      Follow Up   Return for Schedule EGD with Dr. Espitia on 2/7/2024 [facility].    Hospital arrival " time: 0930.    Possible risks/complications, benefits, and alternatives to surgical or invasive procedures have been explained to patient and/or legal guardian.    Patient has been evaluated and can tolerate anesthesia and/or sedation. Risks, benefits, and alternatives to anesthesia and sedation have been explained to the patient and/or legal guardian. Patient verbalizes understanding and is willing to proceed with the above plan.     Patient was given instructions and counseling regarding his condition or for health maintenance advice. Please see specific information pulled into the AVS if appropriate.

## 2024-02-05 NOTE — TELEPHONE ENCOUNTER
PT CALLED BACK AND WAS ADVISED OF BELOW:    Pt is scheduled on 2/28/24 at [facility] with a 4:45 pm arrival. Pt needs to be NPO 2 hours prior.

## 2024-02-05 NOTE — TELEPHONE ENCOUNTER
I tried to call the Pt to let him know when I got his CT scheduled but Pt voicemail is full. I was unable to lmom for the Pt to call the office back.     Pt is scheduled on 2/28/24 at [facility] with a 4:45 pm arrival. Pt needs to be NPO 2 hours prior.

## 2024-02-06 ENCOUNTER — ANESTHESIA EVENT (OUTPATIENT)
Dept: GASTROENTEROLOGY | Facility: HOSPITAL | Age: 64
End: 2024-02-06
Payer: COMMERCIAL

## 2024-02-06 NOTE — ANESTHESIA PREPROCEDURE EVALUATION
Anesthesia Evaluation     Patient summary reviewed and Nursing notes reviewed   NPO Solid Status: > 8 hours  NPO Liquid Status: > 8 hours           Airway   Mallampati: I  TM distance: >3 FB  Neck ROM: full  No difficulty expected  Dental - normal exam     Pulmonary     breath sounds clear to auscultation  (+) asthma (uses Breztri daily),  Cardiovascular   Exercise tolerance: good (4-7 METS)    ECG reviewed  Rhythm: regular  Rate: normal        Neuro/Psych  (+) psychiatric history Anxiety  GI/Hepatic/Renal/Endo    (+) renal disease-    Musculoskeletal     Abdominal    Substance History      OB/GYN          Other        ROS/Med Hx Other: EKG 07/13/23: HR 74, SR                  Anesthesia Plan    ASA 2     general   total IV anesthesia  (Total IV Anesthesia    Patient understands anesthesia not responsible for dental damage.  )  intravenous induction     Anesthetic plan, risks, benefits, and alternatives have been provided, discussed and informed consent has been obtained with: patient and mother.  Pre-procedure education provided  Plan discussed with CRNA.      CODE STATUS:

## 2024-02-07 ENCOUNTER — ANESTHESIA (OUTPATIENT)
Dept: GASTROENTEROLOGY | Facility: HOSPITAL | Age: 64
End: 2024-02-07
Payer: COMMERCIAL

## 2024-02-07 ENCOUNTER — HOSPITAL ENCOUNTER (OUTPATIENT)
Facility: HOSPITAL | Age: 64
Setting detail: HOSPITAL OUTPATIENT SURGERY
Discharge: HOME | End: 2024-02-07
Attending: SURGERY | Admitting: SURGERY
Payer: COMMERCIAL

## 2024-02-07 VITALS
TEMPERATURE: 97.6 F | WEIGHT: 159.17 LBS | OXYGEN SATURATION: 96 % | BODY MASS INDEX: 24.12 KG/M2 | HEART RATE: 73 BPM | DIASTOLIC BLOOD PRESSURE: 77 MMHG | HEIGHT: 68 IN | RESPIRATION RATE: 20 BRPM | SYSTOLIC BLOOD PRESSURE: 97 MMHG

## 2024-02-07 DIAGNOSIS — R11.2 NAUSEA WITH VOMITING: ICD-10-CM

## 2024-02-07 PROCEDURE — 88305 TISSUE EXAM BY PATHOLOGIST: CPT | Performed by: SURGERY

## 2024-02-07 PROCEDURE — 25810000003 LACTATED RINGERS PER 1000 ML

## 2024-02-07 PROCEDURE — 25010000002 PROPOFOL 10 MG/ML EMULSION

## 2024-02-07 RX ORDER — SODIUM CHLORIDE 9 MG/ML
40 INJECTION, SOLUTION INTRAVENOUS AS NEEDED
Status: DISCONTINUED | OUTPATIENT
Start: 2024-02-07 | End: 2024-02-07 | Stop reason: HOSPADM

## 2024-02-07 RX ORDER — PROPOFOL 10 MG/ML
VIAL (ML) INTRAVENOUS AS NEEDED
Status: DISCONTINUED | OUTPATIENT
Start: 2024-02-07 | End: 2024-02-07 | Stop reason: SURG

## 2024-02-07 RX ORDER — SODIUM CHLORIDE 0.9 % (FLUSH) 0.9 %
3 SYRINGE (ML) INJECTION EVERY 12 HOURS SCHEDULED
Status: DISCONTINUED | OUTPATIENT
Start: 2024-02-07 | End: 2024-02-07 | Stop reason: HOSPADM

## 2024-02-07 RX ORDER — SODIUM CHLORIDE, SODIUM LACTATE, POTASSIUM CHLORIDE, CALCIUM CHLORIDE 600; 310; 30; 20 MG/100ML; MG/100ML; MG/100ML; MG/100ML
30 INJECTION, SOLUTION INTRAVENOUS CONTINUOUS
Status: DISCONTINUED | OUTPATIENT
Start: 2024-02-07 | End: 2024-02-07 | Stop reason: HOSPADM

## 2024-02-07 RX ORDER — SODIUM CHLORIDE 0.9 % (FLUSH) 0.9 %
10 SYRINGE (ML) INJECTION AS NEEDED
Status: DISCONTINUED | OUTPATIENT
Start: 2024-02-07 | End: 2024-02-07 | Stop reason: HOSPADM

## 2024-02-07 RX ORDER — LIDOCAINE HYDROCHLORIDE 20 MG/ML
INJECTION, SOLUTION EPIDURAL; INFILTRATION; INTRACAUDAL; PERINEURAL AS NEEDED
Status: DISCONTINUED | OUTPATIENT
Start: 2024-02-07 | End: 2024-02-07 | Stop reason: SURG

## 2024-02-07 RX ADMIN — LIDOCAINE HYDROCHLORIDE 50 MG: 20 INJECTION, SOLUTION EPIDURAL; INFILTRATION; INTRACAUDAL; PERINEURAL at 12:23

## 2024-02-07 RX ADMIN — PROPOFOL 250 MCG/KG/MIN: 10 INJECTION, EMULSION INTRAVENOUS at 12:23

## 2024-02-07 RX ADMIN — PROPOFOL 100 MG: 10 INJECTION, EMULSION INTRAVENOUS at 12:23

## 2024-02-07 RX ADMIN — SODIUM CHLORIDE, POTASSIUM CHLORIDE, SODIUM LACTATE AND CALCIUM CHLORIDE 30 ML/HR: 600; 310; 30; 20 INJECTION, SOLUTION INTRAVENOUS at 10:37

## 2024-02-07 RX ADMIN — PROPOFOL 50 MG: 10 INJECTION, EMULSION INTRAVENOUS at 12:26

## 2024-02-07 NOTE — ANESTHESIA POSTPROCEDURE EVALUATION
Patient: Ferdinand Kaye    Procedure Summary       Date: 02/07/24 Room / Location: [facility] ENDOSCOPY 1 / [facility] ENDOSCOPY    Anesthesia Start: 1221 Anesthesia Stop: 1234    Procedure: ESOPHAGOGASTRODUODENOSCOPY with biopsy Diagnosis:       Nausea with vomiting      (Nausea with vomiting [R11.2])    Surgeons: Boris Espitia MD Provider: Antonette Lema CRNA    Anesthesia Type: general ASA Status: 2            Anesthesia Type: general    Vitals  Vitals Value Taken Time   /80 02/07/24 1307   Temp 36.4 °C (97.6 °F) 02/07/24 1255   Pulse 72 02/07/24 1310   Resp 20 02/07/24 1255   SpO2 97 % 02/07/24 1310   Vitals shown include unfiled device data.        Post Anesthesia Care and Evaluation    Post-procedure mental status: acceptable.  Pain management: satisfactory to patient    Airway patency: patent  Anesthetic complications: No anesthetic complications    Cardiovascular status: acceptable  Respiratory status: acceptable    Comments: Per chart review

## 2024-02-08 LAB
CYTO UR: NORMAL
LAB AP CASE REPORT: NORMAL
LAB AP CLINICAL INFORMATION: NORMAL
PATH REPORT.FINAL DX SPEC: NORMAL
PATH REPORT.GROSS SPEC: NORMAL

## 2024-02-28 ENCOUNTER — HOSPITAL ENCOUNTER (OUTPATIENT)
Dept: CT IMAGING | Facility: HOSPITAL | Age: 64
Discharge: HOME | End: 2024-02-28
Admitting: NURSE PRACTITIONER
Payer: COMMERCIAL

## 2024-02-28 DIAGNOSIS — R11.2 NAUSEA AND VOMITING, UNSPECIFIED VOMITING TYPE: ICD-10-CM

## 2024-02-28 LAB
CREAT BLDA-MCNC: 1 MG/DL (ref 0.6–1.3)
EGFRCR SERPLBLD CKD-EPI 2021: 85.1 ML/MIN/1.73

## 2024-02-28 PROCEDURE — 74177 CT ABD & PELVIS W/CONTRAST: CPT

## 2024-02-28 PROCEDURE — 25510000001 IOPAMIDOL PER 1 ML: Performed by: NURSE PRACTITIONER

## 2024-02-28 PROCEDURE — 82565 ASSAY OF CREATININE: CPT

## 2024-02-28 RX ADMIN — IOPAMIDOL 94 ML: 755 INJECTION, SOLUTION INTRAVENOUS at 17:14

## 2024-03-06 ENCOUNTER — OFFICE VISIT (OUTPATIENT)
Dept: SURGERY | Facility: CLINIC | Age: 64
End: 2024-03-06
Payer: COMMERCIAL

## 2024-03-06 VITALS
HEIGHT: 68 IN | SYSTOLIC BLOOD PRESSURE: 125 MMHG | DIASTOLIC BLOOD PRESSURE: 78 MMHG | HEART RATE: 101 BPM | WEIGHT: 159 LBS | BODY MASS INDEX: 24.1 KG/M2

## 2024-03-06 DIAGNOSIS — K31.A0 INTESTINAL METAPLASIA OF STOMACH: ICD-10-CM

## 2024-03-06 DIAGNOSIS — K52.9 COLITIS: ICD-10-CM

## 2024-03-06 DIAGNOSIS — Z98.890 S/P ENDOSCOPY: Primary | ICD-10-CM

## 2024-03-06 RX ORDER — METRONIDAZOLE 500 MG/1
500 TABLET ORAL 3 TIMES DAILY
Qty: 42 TABLET | Refills: 0 | Status: SHIPPED | OUTPATIENT
Start: 2024-03-06 | End: 2024-03-20

## 2024-03-06 RX ORDER — OMEPRAZOLE 40 MG/1
40 CAPSULE, DELAYED RELEASE ORAL DAILY
Qty: 90 CAPSULE | Refills: 1 | Status: SHIPPED | OUTPATIENT
Start: 2024-03-06 | End: 2024-09-02

## 2024-03-06 RX ORDER — FAMOTIDINE 20 MG/1
20 TABLET, FILM COATED ORAL 2 TIMES DAILY
Qty: 60 TABLET | Refills: 1 | Status: SHIPPED | OUTPATIENT
Start: 2024-03-06

## 2024-03-06 NOTE — PROGRESS NOTES
Chief Complaint: Post-op Follow-up    Subjective      Follow-up visit       History of Present Illness  Ferdinand Kaye is a 64 y.o. male presents to [facility] GENERAL SURGERY for follow-up visit.    Patient presents today for follow-up visit after undergoing EGD on 2/7/2024 performed by Dr. Boris Espitia.  Patient was with intestinal metaplasia of the GE junction per EGD/pathology.    Final Diagnosis  1. Stomach, antrum, biopsy:  - Gastric antrum mucosa with no significant pathologic change  - Negative for Helicobacter pylori on routine H&E stain  - Negative for intestinal metaplasia, dysplasia and malignancy  2. Gastroesophageal junction, biopsy:  - Squamocolumnar mucosa with focal intestinal metaplasia  - Negative for dysplasia and malignancy  Electronically signed by Nicolasa Davidson DO      2/24: Study Result    Narrative & Impression   PROCEDURE:CT ABDOMEN PELVIS W CONTRAST     COMPARISON: [facility], US, US RENAL BILATERAL, 9/01/2023, 17:50.  Other, CT, CT   ABDOMEN PELVIS WO CONTRAST, 7/12/2023, 18:41.     INDICATIONS:nausea, vomiting, diarrhea x 8 months. Denies abd pain.     TECHNIQUE:    After obtaining the patient's consent, CT images were created with non-ionic intravenous   contrast material.       PROTOCOL:     Standard imaging protocol performed                 RADIATION:      DLP: 323mGy*cm               Automated exposure control was utilized to minimize radiation dose.   CONTRAST:94cc Isovue 370 I.V.     FINDINGS:                Lung bases:  Limited imaging lung bases is grossly clear.  No free air is noted below the   diaphragm.     Organs:  Gallbladder is contracted and otherwise unremarkable in appearance.  Low-attenuation   lesions within the liver again noted compatible with small cyst.  Liver is otherwise grossly   unremarkable in appearance.  The pancreas, spleen and adrenal glands are unremarkable.  There is   some cortical scarring of the right kidney.   Small 1.3 cm indeterminate low-attenuation lesion   within the anterior aspect of the right kidney noted.  This appears to correspond with a small cyst   with a thin septation seen on recent ultrasound comparison No hydronephrosis noted.  Left kidney is   grossly unremarkable in appearance.  Delayed imaging demonstrates no suspicious filling defects.     GI tract:  The stomach and the small bowel demonstrate no acute abnormality.  No suspicious   mesenteric adenopathy or fluid collections are noted.  Ileocecal valve and appendix appear within   normal limits.  There is some wall thickening of the distal descending and sigmoid colon with some   very mild stranding noted within the pelvis.  No suspicious adenopathy noted     Pelvis:  Urinary bladder is unremarkable.  Prostate is mildly enlarged.  Fat containing left   inguinal hernia noted     Retroperitoneum:  Aorta is normal in caliber.  Underlying atherosclerotic changes are noted.  No   suspicious retroperitoneal adenopathy     Bones and soft tissues:  No destructive bone lesion noted     IMPRESSION:                 1. Mild wall thickening of the distal descending and rectum sigmoid colon.  Findings are   accentuated by incomplete distention, decompression.  Underlying degree of mild inflammatory or   infectious colitis could be considered.  2. No acute intra-abdominal or intrapelvic abnormality otherwise noted            JAZLYN ZULUAGA MD                Objective     Past Medical History:   Diagnosis Date    Anxiety     Asthma     INHALER    Nausea with vomiting 02/05/2024    Palpitation     FOLLOWS W/DR. JESSICA CORREA, NO CP OR SOA    Seasonal allergies     Ureteral stone        Past Surgical History:   Procedure Laterality Date    COLONOSCOPY      COLONOSCOPY N/A 12/07/2022    Procedure: COLONOSCOPY;  Surgeon: Boris Espitia MD;  Location: [facility] ENDOSCOPY;  Service: General;  Laterality: N/A;  DIVERTICULOSIS    CYSTOSCOPY, URETEROSCOPY, RETROGRADE  PYELOGRAM, STENT INSERTION Right 07/13/2023    Procedure: CYSTOSCOPY URETEROSCOPY RETROGRADE PYELOGRAM, STENT INSERTION, right;  Surgeon: Sophia Florez MD;  Location: [facility] MAIN OR;  Service: Urology;  Laterality: Right;    ENDOSCOPY N/A 2/7/2024    Procedure: ESOPHAGOGASTRODUODENOSCOPY with biopsy;  Surgeon: Boris Espitia MD;  Location: [facility] ENDOSCOPY;  Service: General;  Laterality: N/A;  same    HAND SURGERY Left     INGUINAL HERNIA REPAIR Right     2019    TOOTH EXTRACTION      URETEROSCOPY LASER LITHOTRIPSY WITH STENT INSERTION Right 07/28/2023    Procedure: CYSTOSCOPY URETEROSCOPY RETROGRADE PYELOGRAM STENT EXCHANGE, right;  Surgeon: Sophia Florez MD;  Location: [facility] MAIN OR;  Service: Urology;  Laterality: Right;       Outpatient Medications Marked as Taking for the 3/6/24 encounter (Office Visit) with Daniel April, APRN   Medication Sig Dispense Refill    amitriptyline (ELAVIL) 25 MG tablet       atorvastatin (LIPITOR) 10 MG tablet Take 1 tablet by mouth every night at bedtime.      Budeson-Glycopyrrol-Formoterol (BREZTRI) 160-9-4.8 MCG/ACT aerosol inhaler Inhale 2 puffs 2 (Two) Times a Day.      FLUoxetine (PROzac) 20 MG tablet Take 1 tablet by mouth Daily.      fluticasone (FLONASE) 50 MCG/ACT nasal spray SMARTSIG:Spray(s) Both Nares      Omega-3 Fatty Acids (fish oil) 1000 MG capsule capsule Take  by mouth Daily With Breakfast.         Allergies   Allergen Reactions    Amoxicillin Anaphylaxis and Hives    Penicillins Hives        Family History   Problem Relation Age of Onset    Alcohol abuse Neg Hx     Malig Hyperthermia Neg Hx        Social History     Socioeconomic History    Marital status:    Tobacco Use    Smoking status: Every Day     Current packs/day: 1.00     Average packs/day: 1 pack/day for 50.0 years (50.0 ttl pk-yrs)     Types: Cigarettes    Smokeless tobacco: Never    Tobacco comments:     Last 12/6/22 in the afternoon   Vaping Use    Vaping status: Never Used  "  Substance and Sexual Activity    Alcohol use: Never    Drug use: Never    Sexual activity: Defer       Review of Systems   Constitutional:  Negative for chills and fever.   Gastrointestinal:  Positive for abdominal pain, diarrhea, nausea and vomiting. Negative for abdominal distention, anal bleeding, blood in stool, constipation, rectal pain, GERD and indigestion.        Vital Signs:   /78 (BP Location: Left arm)   Pulse 101   Ht 172.7 cm (68\")   Wt 72.1 kg (159 lb)   BMI 24.18 kg/m²      Physical Exam  Vitals and nursing note reviewed.   Constitutional:       General: He is not in acute distress.     Appearance: Normal appearance.   HENT:      Head: Normocephalic.   Cardiovascular:      Rate and Rhythm: Normal rate.   Pulmonary:      Effort: Pulmonary effort is normal.      Breath sounds: No stridor.   Abdominal:      Palpations: Abdomen is soft.      Tenderness: There is no guarding.   Musculoskeletal:         General: No deformity. Normal range of motion.   Skin:     General: Skin is warm and dry.      Coloration: Skin is not jaundiced.   Neurological:      General: No focal deficit present.      Mental Status: He is alert and oriented to person, place, and time.   Psychiatric:         Mood and Affect: Mood normal.         Thought Content: Thought content normal.          Result Review :          []  Laboratory  []  Radiology  []  Pathology  []  Microbiology  []  EKG/Telemetry   []  Cardiology/Vascular   []  Old records    Today I reviewed Dr. Winslow's operative and pathology report.     Assessment and Plan    Diagnoses and all orders for this visit:    1. S/P endoscopy (Primary)    2. Intestinal metaplasia of stomach    3. Colitis    Other orders  -     omeprazole (priLOSEC) 40 MG capsule; Take 1 capsule by mouth Daily for 180 days.  Dispense: 90 capsule; Refill: 1  -     famotidine (PEPCID) 20 MG tablet; Take 1 tablet by mouth 2 (Two) Times a Day.  Dispense: 60 tablet; Refill: 1  -     " metroNIDAZOLE (Flagyl) 500 MG tablet; Take 1 tablet by mouth 3 (Three) Times a Day for 14 days.  Dispense: 42 tablet; Refill: 0        Follow Up   Return for Follow-up with me in 3 weeks.    After reviewing CT scan with patient, my recommendation was to proceed with a colonoscopy after the colitis settles down.  I have prescribed the patient Flagyl.  If no improvement with the Flagyl patient is willing to undergo colonoscopy.  I have also prescribed omeprazole and Pepcid for the patient's intestinal metaplasia.  Also to see if this improves his nausea and vomiting.  Patient's gallbladder is contracted on CT scan.  I will follow-up with him in 3 weeks to see if his symptoms have improved.    Seek medical emergency services:  Fever greater than 101 associated with chills.  Extreme pain.    Patient was given instructions and counseling regarding his condition or for health maintenance advice. Please see specific information pulled into the AVS if appropriate.

## 2024-04-02 RX ORDER — FAMOTIDINE 20 MG/1
20 TABLET, FILM COATED ORAL 2 TIMES DAILY
Qty: 60 TABLET | Refills: 1 | Status: SHIPPED | OUTPATIENT
Start: 2024-04-02

## 2024-06-10 RX ORDER — OMEPRAZOLE 40 MG/1
40 CAPSULE, DELAYED RELEASE ORAL DAILY
Qty: 90 CAPSULE | Refills: 1 | Status: SHIPPED | OUTPATIENT
Start: 2024-06-10

## 2024-08-12 ENCOUNTER — TELEPHONE (OUTPATIENT)
Dept: SURGERY | Facility: CLINIC | Age: 64
End: 2024-08-12
Payer: COMMERCIAL

## 2024-08-12 NOTE — TELEPHONE ENCOUNTER
PATIENT CALLED IN REGARDING RECALL HE RECEIVED IN THE MAIL.    PATIENT JUST SAW APRIL 03/06/24    PATIENT IS ASKING THAT APRIL'S MEDICAL STAFF CALL HIM TO DISCUSS.

## 2024-09-19 ENCOUNTER — PREP FOR SURGERY (OUTPATIENT)
Dept: OTHER | Facility: HOSPITAL | Age: 64
End: 2024-09-19
Payer: COMMERCIAL

## 2024-09-19 ENCOUNTER — OFFICE VISIT (OUTPATIENT)
Dept: SURGERY | Facility: CLINIC | Age: 64
End: 2024-09-19
Payer: COMMERCIAL

## 2024-09-19 VITALS
HEART RATE: 83 BPM | HEIGHT: 68 IN | DIASTOLIC BLOOD PRESSURE: 82 MMHG | BODY MASS INDEX: 25.46 KG/M2 | WEIGHT: 168 LBS | SYSTOLIC BLOOD PRESSURE: 131 MMHG

## 2024-09-19 DIAGNOSIS — R11.2 NAUSEA AND VOMITING, UNSPECIFIED VOMITING TYPE: ICD-10-CM

## 2024-09-19 DIAGNOSIS — R93.3 ABNORMAL CT SCAN, COLON: Primary | ICD-10-CM

## 2024-09-19 DIAGNOSIS — R19.7 DIARRHEA, UNSPECIFIED TYPE: ICD-10-CM

## 2024-09-19 RX ORDER — SODIUM CHLORIDE 0.9 % (FLUSH) 0.9 %
10 SYRINGE (ML) INJECTION AS NEEDED
OUTPATIENT
Start: 2024-09-19

## 2024-09-19 RX ORDER — SODIUM CHLORIDE 0.9 % (FLUSH) 0.9 %
3 SYRINGE (ML) INJECTION EVERY 12 HOURS SCHEDULED
OUTPATIENT
Start: 2024-09-19

## 2024-09-19 RX ORDER — SACCHAROMYCES BOULARDII 250 MG
250 CAPSULE ORAL 2 TIMES DAILY
COMMUNITY

## 2024-09-19 RX ORDER — FEXOFENADINE HCL 180 MG/1
180 TABLET ORAL DAILY
COMMUNITY

## 2024-09-19 RX ORDER — SODIUM CHLORIDE 9 MG/ML
40 INJECTION, SOLUTION INTRAVENOUS AS NEEDED
OUTPATIENT
Start: 2024-09-19

## 2024-09-19 RX ORDER — POLYETHYLENE GLYCOL 3350 17 G/17G
POWDER, FOR SOLUTION ORAL
Qty: 238 PACKET | Refills: 0 | Status: SHIPPED | OUTPATIENT
Start: 2024-09-19

## 2024-09-24 ENCOUNTER — ANESTHESIA EVENT (OUTPATIENT)
Dept: GASTROENTEROLOGY | Facility: HOSPITAL | Age: 64
End: 2024-09-24
Payer: COMMERCIAL

## 2024-09-25 ENCOUNTER — HOSPITAL ENCOUNTER (OUTPATIENT)
Facility: HOSPITAL | Age: 64
Setting detail: HOSPITAL OUTPATIENT SURGERY
Discharge: HOME | End: 2024-09-25
Attending: SURGERY | Admitting: SURGERY
Payer: COMMERCIAL

## 2024-09-25 ENCOUNTER — ANESTHESIA (OUTPATIENT)
Dept: GASTROENTEROLOGY | Facility: HOSPITAL | Age: 64
End: 2024-09-25
Payer: COMMERCIAL

## 2024-09-25 VITALS
OXYGEN SATURATION: 99 % | HEART RATE: 76 BPM | BODY MASS INDEX: 24.87 KG/M2 | DIASTOLIC BLOOD PRESSURE: 78 MMHG | SYSTOLIC BLOOD PRESSURE: 109 MMHG | RESPIRATION RATE: 20 BRPM | WEIGHT: 163.58 LBS | TEMPERATURE: 97.3 F

## 2024-09-25 DIAGNOSIS — R93.3 ABNORMAL CT SCAN, COLON: ICD-10-CM

## 2024-09-25 PROCEDURE — 25010000002 PROPOFOL 10 MG/ML EMULSION: Performed by: NURSE ANESTHETIST, CERTIFIED REGISTERED

## 2024-09-25 PROCEDURE — 25810000003 LACTATED RINGERS PER 1000 ML

## 2024-09-25 PROCEDURE — 88305 TISSUE EXAM BY PATHOLOGIST: CPT | Performed by: SURGERY

## 2024-09-25 DEVICE — DEV CLIP HEMO RESOLUTION360/ULTR 235CM 17MM STRL: Type: IMPLANTABLE DEVICE | Site: COLON | Status: FUNCTIONAL

## 2024-09-25 RX ORDER — SODIUM CHLORIDE, SODIUM LACTATE, POTASSIUM CHLORIDE, CALCIUM CHLORIDE 600; 310; 30; 20 MG/100ML; MG/100ML; MG/100ML; MG/100ML
30 INJECTION, SOLUTION INTRAVENOUS CONTINUOUS
Status: DISCONTINUED | OUTPATIENT
Start: 2024-09-25 | End: 2024-09-25 | Stop reason: HOSPADM

## 2024-09-25 RX ORDER — LIDOCAINE HYDROCHLORIDE 20 MG/ML
INJECTION, SOLUTION EPIDURAL; INFILTRATION; INTRACAUDAL; PERINEURAL AS NEEDED
Status: DISCONTINUED | OUTPATIENT
Start: 2024-09-25 | End: 2024-09-25 | Stop reason: SURG

## 2024-09-25 RX ORDER — SODIUM CHLORIDE 0.9 % (FLUSH) 0.9 %
3 SYRINGE (ML) INJECTION EVERY 12 HOURS SCHEDULED
Status: DISCONTINUED | OUTPATIENT
Start: 2024-09-25 | End: 2024-09-25 | Stop reason: HOSPADM

## 2024-09-25 RX ORDER — PROPOFOL 10 MG/ML
VIAL (ML) INTRAVENOUS AS NEEDED
Status: DISCONTINUED | OUTPATIENT
Start: 2024-09-25 | End: 2024-09-25 | Stop reason: SURG

## 2024-09-25 RX ORDER — SODIUM CHLORIDE 0.9 % (FLUSH) 0.9 %
10 SYRINGE (ML) INJECTION AS NEEDED
Status: DISCONTINUED | OUTPATIENT
Start: 2024-09-25 | End: 2024-09-25 | Stop reason: HOSPADM

## 2024-09-25 RX ORDER — SODIUM CHLORIDE 9 MG/ML
40 INJECTION, SOLUTION INTRAVENOUS AS NEEDED
Status: DISCONTINUED | OUTPATIENT
Start: 2024-09-25 | End: 2024-09-25 | Stop reason: HOSPADM

## 2024-09-25 RX ADMIN — PROPOFOL 250 MCG/KG/MIN: 10 INJECTION, EMULSION INTRAVENOUS at 11:12

## 2024-09-25 RX ADMIN — LIDOCAINE HYDROCHLORIDE 60 MG: 20 INJECTION, SOLUTION INTRAVENOUS at 11:12

## 2024-09-25 RX ADMIN — SODIUM CHLORIDE, POTASSIUM CHLORIDE, SODIUM LACTATE AND CALCIUM CHLORIDE 30 ML/HR: 600; 310; 30; 20 INJECTION, SOLUTION INTRAVENOUS at 10:02

## 2024-09-25 RX ADMIN — PROPOFOL 50 MG: 10 INJECTION, EMULSION INTRAVENOUS at 11:12

## 2025-01-09 ENCOUNTER — OFFICE VISIT (OUTPATIENT)
Dept: SLEEP MEDICINE | Facility: HOSPITAL | Age: 65
End: 2025-01-09
Payer: COMMERCIAL

## 2025-01-09 VITALS
HEIGHT: 68 IN | HEART RATE: 99 BPM | SYSTOLIC BLOOD PRESSURE: 117 MMHG | WEIGHT: 168.3 LBS | DIASTOLIC BLOOD PRESSURE: 90 MMHG | OXYGEN SATURATION: 96 % | BODY MASS INDEX: 25.51 KG/M2

## 2025-01-09 DIAGNOSIS — R06.83 SNORING: ICD-10-CM

## 2025-01-09 DIAGNOSIS — R53.83 OTHER FATIGUE: ICD-10-CM

## 2025-01-09 DIAGNOSIS — G47.19 EXCESSIVE DAYTIME SLEEPINESS: Primary | ICD-10-CM

## 2025-01-09 PROCEDURE — G0463 HOSPITAL OUTPT CLINIC VISIT: HCPCS

## 2025-01-09 NOTE — PROGRESS NOTES
[facility] SLEEP MEDICINE   [address]  [address]  [place] [address]  [phone]     Referring physician/provider: Kristal Gu APRN   PCP: Dre Del Valle Jr., MD    Type of service: Initial Sleep Medicine Consult.  Date of service: 1/9/2025        Sleep Clinic Initial Consult Visit          HISTORY OF PRESENT ILLNESS    Chief Complaint: Nocturnal awakenings, snoring    Ferdinand Kaye is a 64 y.o. male that was seen today, on 1/9/2025 at [facility] SLEEP MEDICINE for nocturnal awakenings, snoring and daytime sleepiness.  He states when people have slept around him they have told him that he snores loudly and they have seen him stop breathing in his sleep before.  He also reports symptoms of daytime sleepiness every day and also feels tired and fatigued.  He wakes up 6-8 times per night for uncertain reasons. he says he symptoms started about 4 years ago.  He only takes naps on weekends due to work schedule, but he would like to take a nap every day.  He drinks 8 cups of coffee per day. He also has symptoms of morning headaches, dry mouth, restless sleep.  He smokes 3/4 pack of cigarettes per day and has tried medication and patches/gum before without success.    History of Sleep Study: No  ESS today: 20  Occupation:      Symptoms:   Have you ever awakened gasping for breath, coughing, choking:  [x]   Yes     []   No   Witnessed apneas: [x]   Yes     []   No   Loud Snoring: [x]   Yes     []   No   Do you drive a commercial vehicle:  []   Yes     [x]   No   History of any near accidents while driving due to sleepiness in the past 5 years: []   Yes     [x]   No     Sleep schedule:  Bedtime: 8 PM  Rise Time: 4 AM  Sleep Latency: 3 to 5 minutes  Number of awakenings per night: 6-8  Reasons for awakenings: uncertain  Number of naps per day: weekends takes naps   Weekends: Variable schedule on weekends, sleeps 8 to 12 hours    Social  "History:  Tobacco use: smokes 3/4 pack per day   Alcohol use: none   Caffeinated drinks: 8 cups of coffee per day      Medical conditions(PMH)(relevant to sleep medicine)  HLD  Nasal congestion     Family hx(parents and siblings) (pertaining to sleep medicine)  none    ROS:    Positive for: See HPI      Negative for:  Symptoms consistent with the clinical diagnosis of Restless legs syndrome  Symptoms consistent with the clinical diagnosis of Cataplexy   Parasomnias such as sleep walking   See scanned media document for other sleep related questions      Allergies: Amoxicillin and Penicillins       Objective   Vital Signs:   Vitals:    01/09/25 1300   BP: 117/90   Pulse: 99   SpO2: 96%   Weight: 76.3 kg (168 lb 4.8 oz)   Height: 172.7 cm (68\")   PainSc: 0-No pain     Body mass index is 25.59 kg/m².      PHYSICAL EXAM  CONSTITUTIONAL:  Non-toxic, In no overt distress   ENT: Mallampati class 3  NECK:Neck Circumference: 14.5 inches  RESPIRATORY SYSTEM: Breathing appears nonlabored   CARDIOVASULAR SYSTEM: Regular rate  NEUROLOGICAL SYSTEM: answers questions appropriately      Result Review   The following data was reviewed by: Hunter Henriquez DO on 01/09/2025:  [x]  Medications reviewed        ASSESSMENT/PLAN  Diagnoses and all orders for this visit:    1. Excessive daytime sleepiness (Primary)  -     Home Sleep Study; Future    2. Other fatigue  -     Home Sleep Study; Future    3. Snoring  -     Home Sleep Study; Future      He has loud snoring, witnessed apneas and severe daytime sleepiness. We will start with checking for AXEL as cause of daytime sleepiness.   I have discussed home sleep apnea testing (HSAT). HSAT ordered. Discussed if HSAT is negative or inconclusive will proceed with in lab sleep study.  Discussed recommendation for starting treatment with positive airway pressure if obstructive sleep apnea is present.   Patient is agreeable to starting treatment with PAP     Diastolic blood pressure is elevated " today discussed checking blood pressure at home and if high informing PCP    Obesity, patient's BMI is Body mass index is 25.59 kg/m².. I have discussed the relationship between weight and sleep apnea.There is direct correlation between weight and severity of sleep apnea.  Weight reduction is encouraged, as it may reduce the severity of sleep apnea.      I have also discussed with the patient the following  Untreated AXEL is associated with increased risks of stroke, heart attack, heart failure, motor vehicle accidents.   Recommended no driving or operating machinery if feeling sleepy. Discussed options of taking naps and getting rides with other people.   Generally most people need about 7 to 9 hours of sleep per night.       FOLLOW UP  Return for 31 to 90 days after PAP setup, Follow up after study.  Patient was given instructions and counseling regarding his condition or for health maintenance advice. Please see specific information pulled into the AVS if appropriate.         Patient's questions were answered.  Thank you for allowing me to participate in the care of this patient.  Dictated Utilizing Dragon Dictation. Please note that portions of this note were completed with a voice recognition program. Part of this note may be an electronic transcription/translation of spoken language to printed text using the Dragon Dictation System.      [facility] SLEEP MEDICINE   Hunter Henriquez DO  01/09/25  14:58 EST

## 2025-02-12 ENCOUNTER — HOSPITAL ENCOUNTER (OUTPATIENT)
Dept: SLEEP MEDICINE | Facility: HOSPITAL | Age: 65
Discharge: HOME | End: 2025-02-12
Admitting: STUDENT IN AN ORGANIZED HEALTH CARE EDUCATION/TRAINING PROGRAM
Payer: COMMERCIAL

## 2025-02-12 DIAGNOSIS — G47.19 EXCESSIVE DAYTIME SLEEPINESS: ICD-10-CM

## 2025-02-12 DIAGNOSIS — R53.83 OTHER FATIGUE: ICD-10-CM

## 2025-02-12 DIAGNOSIS — R06.83 SNORING: ICD-10-CM

## 2025-02-12 PROCEDURE — G0399 HOME SLEEP TEST/TYPE 3 PORTA: HCPCS

## 2025-02-20 DIAGNOSIS — G47.33 OSA (OBSTRUCTIVE SLEEP APNEA): Primary | ICD-10-CM

## 2025-03-05 ENCOUNTER — TELEPHONE (OUTPATIENT)
Dept: SLEEP MEDICINE | Facility: HOSPITAL | Age: 65
End: 2025-03-05
Payer: COMMERCIAL

## 2025-04-09 ENCOUNTER — DOCUMENTATION (OUTPATIENT)
Dept: SLEEP MEDICINE | Facility: HOSPITAL | Age: 65
End: 2025-04-09
Payer: COMMERCIAL

## 2025-04-09 NOTE — PROGRESS NOTES
Patient requested decrease in pressure. He is on 6-18cm.  His 95th percentile pressure is 13.6  I Decreased maximum pressure to 13.6 and I increased EPR to 3.      Hunter Henriquez,   4/9/2025

## 2025-05-05 ENCOUNTER — OFFICE VISIT (OUTPATIENT)
Dept: SLEEP MEDICINE | Facility: HOSPITAL | Age: 65
End: 2025-05-05
Payer: COMMERCIAL

## 2025-05-05 VITALS
OXYGEN SATURATION: 94 % | DIASTOLIC BLOOD PRESSURE: 84 MMHG | HEART RATE: 107 BPM | SYSTOLIC BLOOD PRESSURE: 133 MMHG | HEIGHT: 68 IN | WEIGHT: 167.1 LBS | BODY MASS INDEX: 25.33 KG/M2

## 2025-05-05 DIAGNOSIS — G47.19 EXCESSIVE DAYTIME SLEEPINESS: ICD-10-CM

## 2025-05-05 DIAGNOSIS — G47.33 OSA (OBSTRUCTIVE SLEEP APNEA): Primary | ICD-10-CM

## 2025-05-05 PROCEDURE — 99214 OFFICE O/P EST MOD 30 MIN: CPT | Performed by: STUDENT IN AN ORGANIZED HEALTH CARE EDUCATION/TRAINING PROGRAM

## 2025-05-05 PROCEDURE — G0463 HOSPITAL OUTPT CLINIC VISIT: HCPCS

## 2025-05-05 NOTE — PROGRESS NOTES
[facility]    SLEEP CLINIC FOLLOW UP PROGRESS NOTE.    Ferdinand Kaye  [id number]   1960  65 y.o.  male      PCP: Dre Del Valle Jr., MD    Date of visit: 5/5/2025    No chief complaint on file.      HPI:  This is a 65 y.o. years old patient is here for the management of obstructive sleep apnea.    Last sleep apnea testing was on 2/12/2025 HSAT which showed Mild obstructive sleep apnea with an AHI of 12.4/h and lowest oxygen saturation of 88%.   He is here for first visit after starting on CPAP.  He was initially started on 6 to 18 cm H2O and on April 9 he requested decrease in pressure due to trouble tolerating.  Pressure was decreased to maximum pressure of 13.6 as his 95th percentile pressure was 13.6 and EPR was increased to 3.  He reports today he is now tolerating the pressures.   He has not noticed any significant improvement in daytime sleepiness since starting CPAP.  He goes to bed at 8 PM and gets up at 4:30 AM.  He wakes up 1-3 times per night with once to use the restroom and other times with aches and pains.  He believes he is sleeping around 7 hours a night.  He typically does not nap during the day.  He reports never feeling rested.  His sleepiness started about 6 to 7 years ago.  His legs do not bother him at nighttime.  He denies sleep paralysis, muscle weakness with laughing, telling a joke, getting excited, sleep hallucinations.  He does not find short 20 minute naps restorative. He sometimes finds long naps 2 hours restorative.   On weekends He sleeps in longer but still feels sleepy during the day.  He consumes about 10 cups of coffee per day.  His medications have been reviewed. He does not appear to be on any sedating medication.   He has not had blood work done in the past several months.     ESS: 20    PAP download data dates March 30 through April 28, 2025  Device: AirSense 10 AutoSet  Mask type: Fullface  Pressure : 6 to 13.6 cm  % days used >4 hours:  "77  Average usage days used: 6 hours 15 minutes  AHI: 4.8  Median leak: 1.5  95th % leak 20.9  DME supplier: [employer] (habits pertaining to sleep medicine)  History tobacco use: Yes currently smokes cigarettes  History of alcohol use: None per week  Caffeine use: 10 cups coffee per day      REVIEW OF SYSTEMS:   Pertaining positive symptoms are:  See scanned document      PHYSICAL EXAMINATION:  CONSTITUTIONAL:  Vitals:    05/05/25 1500   BP: 133/84   Pulse: 107   SpO2: 94%   Weight: 75.8 kg (167 lb 1.6 oz)   Height: 172.7 cm (67.99\")    Body mass index is 25.41 kg/m².   RESP SYSTEM: No respiratory distress  CARDIOVASULAR: Regular rate  NEURO: Answers questions appropriately     He was having pressure issues with pressure being too high.  He is not having the issue now with pressures.   Pressure was decreased about 1 month ago      ASSESSMENT AND PLAN:  Diagnoses and all orders for this visit:    1. AXEL (obstructive sleep apnea) (Primary)    2. Excessive daytime sleepiness    His pressure was decreased about a month ago due to trouble tolerating higher pressures. It was adjusted to 6-13.6cm H2O. He is tolerating pressures now.   His AHI per machine is 4.8/h on average.  His 95th percentile pressure is 13.5.  He has been on CPAP therapy since around the middle of march.   He continues to have significant daytime sleepiness and his average usage is about 6 hours 15 minutes/day.  His percentage of days use greater than 4 hours is 77%.  Pressure settings adjusted today minimum pressure 10 maximum pressure 14.  Recommended using CPAP during all sleep time to try to get 7 hours per day.  Plan for follow-up in 1 month if he continues to have significant daytime sleepiness we will plan for in lab CPAP titration study.  He denies sleep paralysis, muscle weakness with laughing, telling a joke, getting excited, sleep hallucinations. Clinical picture does not seem consistent with narcolepsy or idiopathic hypersomnia at " this time.   He does not find short 20 minute naps restorative. He sometimes finds long naps 2 hours restorative.   Discussed dangers of drowsy driving.  Recommended no driving if feeling sleepy, discussed options of other  and 20 minute naps.      Return in about 1 month (around 6/5/2025). . Patient's questions were answered.    I have independently reviewed the PAP compliance data and discussed with the patient the download data and encouarged the patient to continue to use the device. The device is benefiting the patient and the device is medically necessary.  The patient is also instructed to get the supplies from the DME company and and change them on a regular basis.  A prescription for supplies has been sent to the DME company.       Hunter Henriquez, DO    I have spent 30 minutes today on this encounter before, during and after the visit interviewing the patient and formulating my assessment and plan.

## 2025-06-05 ENCOUNTER — OFFICE VISIT (OUTPATIENT)
Dept: SLEEP MEDICINE | Facility: HOSPITAL | Age: 65
End: 2025-06-05
Payer: COMMERCIAL

## 2025-06-05 VITALS
HEART RATE: 90 BPM | WEIGHT: 164.3 LBS | DIASTOLIC BLOOD PRESSURE: 75 MMHG | OXYGEN SATURATION: 95 % | HEIGHT: 68 IN | SYSTOLIC BLOOD PRESSURE: 133 MMHG | BODY MASS INDEX: 24.9 KG/M2

## 2025-06-05 DIAGNOSIS — G47.33 OSA (OBSTRUCTIVE SLEEP APNEA): Primary | ICD-10-CM

## 2025-06-05 DIAGNOSIS — G47.19 EXCESSIVE DAYTIME SLEEPINESS: ICD-10-CM

## 2025-06-05 PROCEDURE — G0463 HOSPITAL OUTPT CLINIC VISIT: HCPCS

## 2025-06-05 PROCEDURE — 99213 OFFICE O/P EST LOW 20 MIN: CPT | Performed by: STUDENT IN AN ORGANIZED HEALTH CARE EDUCATION/TRAINING PROGRAM

## 2025-06-05 NOTE — PROGRESS NOTES
[facility]    SLEEP CLINIC FOLLOW UP PROGRESS NOTE.    Ferdinand Kaye  [id number]   1960  65 y.o.  male      PCP: Dre Del Valle Jr., MD    Date of visit: 6/5/2025    No chief complaint on file.      HPI:  This is a 65 y.o. years old patient is here for the management of obstructive sleep apnea.    He had home sleep apnea testing done on February 12, 2025 which showed mild obstructive sleep apnea with an AHI of 12.4/h and lowest oxygen saturation of 88% and there was 2.3 minutes of time with saturation less than 90%.  Last visit his pressures were adjusted to 10 to 14 cm H2O and he previously had trouble tolerating higher pressures.  He is still having significant daytime sleepiness.  His sleepiness has not changed since last visit.  There are some days where he has taken the CPAP machine off early.  He goes to bed at 8 PM and gets up at 4:30 AM and he wakes up 3 times per night, once to use the restroom and does wake up sometimes with mask discomfort and then does not put the mask back on every night. He takes naps on weekends.   He denies snoring when he is using the CPAP.  He smokes close to bedtime and he drinks caffeine until dinnertime.    Occupation:      ESS: 19    PAP download data dates April 6 through June 4, 2025  Device: AirSense 10 AutoSet  Mask type: Fullface  Pressure : 10-14, 95th percentile 13.1  % days used >4 hours: 67  Average usage days used: 5 hours 56 minutes  AHI: 4.6  Median leak: 3.1  95th % leak 22.3  DME supplier: [employer] (habits pertaining to sleep medicine)  History tobacco use: Yes currently smoking little less than 1 pack/day  History of alcohol use: 0 per week  Caffeine use: 10 cups coffee per day      REVIEW OF SYSTEMS:   Pertaining positive symptoms are:  Cough, shortness of breath      PHYSICAL EXAMINATION:  CONSTITUTIONAL:  Vitals:    06/05/25 1300   BP: 133/75   Pulse: 90   SpO2: 95%   Weight: 74.5 kg (164 lb 4.8 oz)  "  Height: 172.7 cm (67.99\")    Body mass index is 24.99 kg/m².   RESP SYSTEM: No respiratory distress  CARDIOVASULAR: Regular rate  NEURO: Answers questions appropriately       BMI is within normal parameters. No other follow-up for BMI required.      ASSESSMENT AND PLAN:  Diagnoses and all orders for this visit:    1. AXEL (obstructive sleep apnea) (Primary)  -     Polysomnography 4 or More Parameters With Titration; Future    2. Excessive daytime sleepiness    He is averaging about 6 hours of use of CPAP daily.  His ESS is still highly elevated at 19. CPAP titration ordered.     Recommend extending total sleep time to at least 7 hours a night with CPAP usage.  Titration study scheduled to determine if pressures are adequate for his AXEL.  Recommended avoiding nicotine within 4 hours of bedtime and recommended avoiding caffeine within 8 hours of bedtime.  He denies sleep paralysis, muscle weakness with laughing, telling a joke, getting excited, sleep hallucinations.     I have independently reviewed the PAP compliance data and discussed with the patient the download data and encouarged the patient to continue to use the device. The device is benefiting the patient and the device is medically necessary.  The patient is also instructed to get the supplies from the [employer] and and change them on a regular basis.    Return for Follow up after study. . Patient's questions were answered.      Hunter Henriquez, DO              "

## 2025-08-08 ENCOUNTER — HOSPITAL ENCOUNTER (OUTPATIENT)
Dept: SLEEP MEDICINE | Facility: HOSPITAL | Age: 65
End: 2025-08-08
Payer: COMMERCIAL

## 2025-08-08 VITALS — BODY MASS INDEX: 24.89 KG/M2 | HEIGHT: 68 IN | WEIGHT: 164.24 LBS

## 2025-08-08 DIAGNOSIS — G47.33 OSA (OBSTRUCTIVE SLEEP APNEA): ICD-10-CM

## 2025-08-08 PROCEDURE — 95811 POLYSOM 6/>YRS CPAP 4/> PARM: CPT

## 2025-08-21 ENCOUNTER — TELEPHONE (OUTPATIENT)
Dept: SLEEP MEDICINE | Facility: HOSPITAL | Age: 65
End: 2025-08-21
Payer: COMMERCIAL

## 2025-08-21 DIAGNOSIS — G47.33 OSA (OBSTRUCTIVE SLEEP APNEA): Primary | ICD-10-CM

## (undated) DEVICE — LINER SURG CANSTR SXN S/RIGD 1500CC

## (undated) DEVICE — CATH URETRL FLXITP POLLACK STD 5F 70CM

## (undated) DEVICE — GW ULTRATRACK HYBRID STR/TP .035IN 3X150CM EA/5

## (undated) DEVICE — SNAR E/S POLYP SNAREMASTER OVL/10MM 2.8X2300MM YEL

## (undated) DEVICE — CYSTO PACK: Brand: MEDLINE INDUSTRIES, INC.

## (undated) DEVICE — SOLIDIFIER LIQLOC PLS 1500CC BT

## (undated) DEVICE — SOL IRR NACL 0.9PCT BT 1000ML

## (undated) DEVICE — Device

## (undated) DEVICE — SOL IRRG H2O PL/BG 1000ML STRL

## (undated) DEVICE — SYS IRR PUMP SGL ACTN VAC SYR 10CC

## (undated) DEVICE — BASIC SINGLE BASIN-LF: Brand: MEDLINE INDUSTRIES, INC.

## (undated) DEVICE — GLV SURG BIOGEL LTX PF 7 1/2

## (undated) DEVICE — CONN JET HYDRA H20 AUXILIARY DISP

## (undated) DEVICE — INJ SUB/MUCUS ELEVIEW FOR/GI/ENDO/PROC AMPL/10ML

## (undated) DEVICE — Device: Brand: SINGLE USE INJECTOR NM600/610

## (undated) DEVICE — Device: Brand: DEFENDO AIR/WATER/SUCTION AND BIOPSY VALVE

## (undated) DEVICE — SINGLE-USE BIOPSY FORCEPS: Brand: RADIAL JAW 4

## (undated) DEVICE — SOL IRR NACL 0.9PCT 3000ML

## (undated) DEVICE — GLV SURG SENSICARE SLT PF LF 7 STRL

## (undated) DEVICE — KT VLV BIOP DEFENDO SXN AIR/WATER

## (undated) DEVICE — SUREFIT, DUAL DISPERSIVE ELECTRODE, CONTACT QUALITY MONITOR: Brand: SUREFIT

## (undated) DEVICE — THE SINGLE USE ETRAP – POLYP TRAP IS USED FOR SUCTION RETRIEVAL OF ENDOSCOPICALLY REMOVED POLYPS.: Brand: ETRAP

## (undated) DEVICE — BLCK/BITE BLOX WO/DENTL/RIM W/STRAP 54F

## (undated) DEVICE — TOWEL,OR,DSP,ST,BLUE,STD,4/PK,20PK/CS: Brand: MEDLINE

## (undated) DEVICE — NITINOL STONE RETRIEVAL BASKET: Brand: ESCAPE

## (undated) DEVICE — Y-TYPE TUR/BLADDER IRRIGATION SET, REGULATING CLAMP

## (undated) DEVICE — SKIN PREP TRAY W/CHG: Brand: MEDLINE INDUSTRIES, INC.